# Patient Record
Sex: MALE | Race: WHITE | NOT HISPANIC OR LATINO | URBAN - METROPOLITAN AREA
[De-identification: names, ages, dates, MRNs, and addresses within clinical notes are randomized per-mention and may not be internally consistent; named-entity substitution may affect disease eponyms.]

---

## 2022-10-27 ENCOUNTER — INPATIENT (INPATIENT)
Facility: HOSPITAL | Age: 62
LOS: 5 days | Discharge: HOME | End: 2022-11-02
Attending: STUDENT IN AN ORGANIZED HEALTH CARE EDUCATION/TRAINING PROGRAM | Admitting: STUDENT IN AN ORGANIZED HEALTH CARE EDUCATION/TRAINING PROGRAM

## 2022-10-27 VITALS
DIASTOLIC BLOOD PRESSURE: 96 MMHG | TEMPERATURE: 98 F | HEART RATE: 113 BPM | RESPIRATION RATE: 18 BRPM | SYSTOLIC BLOOD PRESSURE: 138 MMHG | OXYGEN SATURATION: 96 %

## 2022-10-27 DIAGNOSIS — I21.4 NON-ST ELEVATION (NSTEMI) MYOCARDIAL INFARCTION: ICD-10-CM

## 2022-10-27 LAB
ALBUMIN SERPL ELPH-MCNC: 4.4 G/DL — SIGNIFICANT CHANGE UP (ref 3.3–5)
ALP SERPL-CCNC: 55 U/L — SIGNIFICANT CHANGE UP (ref 40–120)
ALT FLD-CCNC: 53 U/L — HIGH (ref 4–41)
ANION GAP SERPL CALC-SCNC: 12 MMOL/L — SIGNIFICANT CHANGE UP (ref 7–14)
ANION GAP SERPL CALC-SCNC: 15 MMOL/L — HIGH (ref 7–14)
APTT BLD: 28.2 SEC — SIGNIFICANT CHANGE UP (ref 27–36.3)
AST SERPL-CCNC: 77 U/L — HIGH (ref 4–40)
BASOPHILS # BLD AUTO: 0.04 K/UL — SIGNIFICANT CHANGE UP (ref 0–0.2)
BASOPHILS NFR BLD AUTO: 0.5 % — SIGNIFICANT CHANGE UP (ref 0–2)
BILIRUB SERPL-MCNC: 1.7 MG/DL — HIGH (ref 0.2–1.2)
BUN SERPL-MCNC: 12 MG/DL — SIGNIFICANT CHANGE UP (ref 7–23)
BUN SERPL-MCNC: 12 MG/DL — SIGNIFICANT CHANGE UP (ref 7–23)
CALCIUM SERPL-MCNC: 9.7 MG/DL — SIGNIFICANT CHANGE UP (ref 8.4–10.5)
CALCIUM SERPL-MCNC: 9.8 MG/DL — SIGNIFICANT CHANGE UP (ref 8.4–10.5)
CHLORIDE SERPL-SCNC: 97 MMOL/L — LOW (ref 98–107)
CHLORIDE SERPL-SCNC: 98 MMOL/L — SIGNIFICANT CHANGE UP (ref 98–107)
CO2 SERPL-SCNC: 25 MMOL/L — SIGNIFICANT CHANGE UP (ref 22–31)
CO2 SERPL-SCNC: 31 MMOL/L — SIGNIFICANT CHANGE UP (ref 22–31)
CREAT SERPL-MCNC: 0.96 MG/DL — SIGNIFICANT CHANGE UP (ref 0.5–1.3)
CREAT SERPL-MCNC: 1.02 MG/DL — SIGNIFICANT CHANGE UP (ref 0.5–1.3)
EGFR: 83 ML/MIN/1.73M2 — SIGNIFICANT CHANGE UP
EGFR: 89 ML/MIN/1.73M2 — SIGNIFICANT CHANGE UP
EOSINOPHIL # BLD AUTO: 0.16 K/UL — SIGNIFICANT CHANGE UP (ref 0–0.5)
EOSINOPHIL NFR BLD AUTO: 1.8 % — SIGNIFICANT CHANGE UP (ref 0–6)
FLUAV AG NPH QL: SIGNIFICANT CHANGE UP
FLUBV AG NPH QL: SIGNIFICANT CHANGE UP
GLUCOSE SERPL-MCNC: 102 MG/DL — HIGH (ref 70–99)
GLUCOSE SERPL-MCNC: 94 MG/DL — SIGNIFICANT CHANGE UP (ref 70–99)
HCT VFR BLD CALC: 41.6 % — SIGNIFICANT CHANGE UP (ref 39–50)
HGB BLD-MCNC: 13.8 G/DL — SIGNIFICANT CHANGE UP (ref 13–17)
IANC: 6.73 K/UL — SIGNIFICANT CHANGE UP (ref 1.8–7.4)
IMM GRANULOCYTES NFR BLD AUTO: 0.7 % — SIGNIFICANT CHANGE UP (ref 0–0.9)
INR BLD: 1.35 RATIO — HIGH (ref 0.88–1.16)
LYMPHOCYTES # BLD AUTO: 1.03 K/UL — SIGNIFICANT CHANGE UP (ref 1–3.3)
LYMPHOCYTES # BLD AUTO: 11.7 % — LOW (ref 13–44)
MAGNESIUM SERPL-MCNC: 1.7 MG/DL — SIGNIFICANT CHANGE UP (ref 1.6–2.6)
MCHC RBC-ENTMCNC: 29.4 PG — SIGNIFICANT CHANGE UP (ref 27–34)
MCHC RBC-ENTMCNC: 33.2 GM/DL — SIGNIFICANT CHANGE UP (ref 32–36)
MCV RBC AUTO: 88.5 FL — SIGNIFICANT CHANGE UP (ref 80–100)
MONOCYTES # BLD AUTO: 0.8 K/UL — SIGNIFICANT CHANGE UP (ref 0–0.9)
MONOCYTES NFR BLD AUTO: 9.1 % — SIGNIFICANT CHANGE UP (ref 2–14)
NEUTROPHILS # BLD AUTO: 6.73 K/UL — SIGNIFICANT CHANGE UP (ref 1.8–7.4)
NEUTROPHILS NFR BLD AUTO: 76.2 % — SIGNIFICANT CHANGE UP (ref 43–77)
NRBC # BLD: 0 /100 WBCS — SIGNIFICANT CHANGE UP (ref 0–0)
NRBC # FLD: 0 K/UL — SIGNIFICANT CHANGE UP (ref 0–0)
NT-PROBNP SERPL-SCNC: 4021 PG/ML — HIGH
PLATELET # BLD AUTO: 283 K/UL — SIGNIFICANT CHANGE UP (ref 150–400)
POTASSIUM SERPL-MCNC: 3.2 MMOL/L — LOW (ref 3.5–5.3)
POTASSIUM SERPL-MCNC: SIGNIFICANT CHANGE UP MMOL/L (ref 3.5–5.3)
POTASSIUM SERPL-SCNC: 3.2 MMOL/L — LOW (ref 3.5–5.3)
POTASSIUM SERPL-SCNC: SIGNIFICANT CHANGE UP MMOL/L (ref 3.5–5.3)
PROT SERPL-MCNC: 7.8 G/DL — SIGNIFICANT CHANGE UP (ref 6–8.3)
PROTHROM AB SERPL-ACNC: 15.7 SEC — HIGH (ref 10.5–13.4)
RBC # BLD: 4.7 M/UL — SIGNIFICANT CHANGE UP (ref 4.2–5.8)
RBC # FLD: 13.7 % — SIGNIFICANT CHANGE UP (ref 10.3–14.5)
RSV RNA NPH QL NAA+NON-PROBE: SIGNIFICANT CHANGE UP
SARS-COV-2 RNA SPEC QL NAA+PROBE: SIGNIFICANT CHANGE UP
SODIUM SERPL-SCNC: 137 MMOL/L — SIGNIFICANT CHANGE UP (ref 135–145)
SODIUM SERPL-SCNC: 141 MMOL/L — SIGNIFICANT CHANGE UP (ref 135–145)
TROPONIN T, HIGH SENSITIVITY RESULT: 129 NG/L — CRITICAL HIGH
TROPONIN T, HIGH SENSITIVITY RESULT: 144 NG/L — CRITICAL HIGH
WBC # BLD: 8.82 K/UL — SIGNIFICANT CHANGE UP (ref 3.8–10.5)
WBC # FLD AUTO: 8.82 K/UL — SIGNIFICANT CHANGE UP (ref 3.8–10.5)

## 2022-10-27 PROCEDURE — 99285 EMERGENCY DEPT VISIT HI MDM: CPT

## 2022-10-27 PROCEDURE — 71045 X-RAY EXAM CHEST 1 VIEW: CPT | Mod: 26

## 2022-10-27 PROCEDURE — 93010 ELECTROCARDIOGRAM REPORT: CPT

## 2022-10-27 RX ORDER — HEPARIN SODIUM 5000 [USP'U]/ML
5000 INJECTION INTRAVENOUS; SUBCUTANEOUS ONCE
Refills: 0 | Status: COMPLETED | OUTPATIENT
Start: 2022-10-27 | End: 2022-10-27

## 2022-10-27 RX ORDER — HEPARIN SODIUM 5000 [USP'U]/ML
6000 INJECTION INTRAVENOUS; SUBCUTANEOUS EVERY 6 HOURS
Refills: 0 | Status: DISCONTINUED | OUTPATIENT
Start: 2022-10-27 | End: 2022-10-28

## 2022-10-27 RX ORDER — FUROSEMIDE 40 MG
40 TABLET ORAL ONCE
Refills: 0 | Status: COMPLETED | OUTPATIENT
Start: 2022-10-27 | End: 2022-10-27

## 2022-10-27 RX ORDER — HEPARIN SODIUM 5000 [USP'U]/ML
INJECTION INTRAVENOUS; SUBCUTANEOUS
Qty: 25000 | Refills: 0 | Status: DISCONTINUED | OUTPATIENT
Start: 2022-10-27 | End: 2022-10-28

## 2022-10-27 RX ADMIN — HEPARIN SODIUM 5000 UNIT(S): 5000 INJECTION INTRAVENOUS; SUBCUTANEOUS at 22:56

## 2022-10-27 RX ADMIN — HEPARIN SODIUM 1000 UNIT(S)/HR: 5000 INJECTION INTRAVENOUS; SUBCUTANEOUS at 22:57

## 2022-10-27 RX ADMIN — Medication 40 MILLIGRAM(S): at 21:53

## 2022-10-27 NOTE — ED PROVIDER NOTE - EKG ADDITIONAL INFORMATION FREE TEXT
Carlos: No hyper-acute T waves, no malignant dysrhythmia, no ischemic ST segment changes. MAYRA shepard (old).

## 2022-10-27 NOTE — ED ADULT NURSE NOTE - OBJECTIVE STATEMENT
Patient is a 63 yo male, unknown PMH, presenting from [facility] with NSTEMI. AAOx4, no signs of distress, reports he went to the hospital because he was experiencing shortness of breath with exertion, orthopnea and leg swelling. He reports this happened to him 1 year ago and then he had a syncopal episode, went to [facility] and was given cardiac medications but has not had any follow-up. Does not remember diagnoses or medications he takes. Lung sounds clear, b/l lower extremities with 2+ edema. Denies chest pain, dizziness, palpitations, fever, chills, cough, nausea, vomiting. Placed on cardiac monitor, afib in 120s, other VSS. Has PIV in RAC. Fall precautions maintained.

## 2022-10-27 NOTE — CONSULT NOTE ADULT - SUBJECTIVE AND OBJECTIVE BOX
HPI: 61yo M w/ nonobstructive CAD, HFrEF, (noncompliant with follow-up or meds), etoh abuse (last drink 2 months ago) presents to outside hospital ([facility]) with SOB and lower extremity edema x 5 days.  Found to have elevated troponins and transferred to [facility] for NSTEMI and ischemic eval.  Patient currently denies any symptoms states dyspnea on exertion and lower extremity swelling for 5 days.  Patient states had a cardiac angiogram one year ago at [facility] showing no blockages and an echo showing a weak heart.  Patient states he did not follow-up with any doctors after discharge due to being lazy. Denies any chest pain, cough, fevers, dizziness, syncope, abd pain, back pain, N/V/D, recent sick contact, recent travel.     	   No Known Allergies    Intolerances    MEDICATIONS:    PAST MEDICAL & SURGICAL HISTORY:    FAMILY HISTORY:    SUBSTANCE USE  Tobacco Usage:  denies  Alcohol Usage: denies  Recreational drugs: denies    REVIEW OF SYSTEMS:  CONSTITUTIONAL: No fevers, No chills, No fatigue, + weight gain  RESPIRATORY: + shortness of breath, No cough, No wheezing, No hemoptysis  CARDIOVASCULAR: No chest pain. No palpitations, No pleuritic pain  GASTROINTESTINAL: No abdominal pain, No nausea, No vomiting, No hematemesis, No diarrhea No constipation. No melena  GENITOURINARY: No dysuria, No frequency, No incontinence, No hematuria  NEUROLOGICAL: No dizziness, No lightheadedness, No syncope, No LOC, No headache, No numbness or weakness  EXTREMITIES: + lower extremity Edema, No joint pain, No joint swelling.  SKIN: No diaphoresis. No itching, No rashes, No pressure ulcers  All other review of systems is negative unless indicated above.    T(C): 36.9 (10-27-22 @ 19:16), Max: 36.9 (10-27-22 @ 19:16)  HR: 126 (10-27-22 @ 19:16) (113 - 126)  BP: 117/96 (10-27-22 @ 19:16) (117/96 - 138/96)  RR: 22 (10-27-22 @ 19:16) (18 - 22)  SpO2: 97% (10-27-22 @ 19:16) (96% - 97%)  Wt(kg): --  I&O's Summary      Physical Exam:  General: NAD  Cardiovascular: Normal S1 S2, No JVD, No murmurs, No edema  Respiratory: Lungs clear to auscultation	  Gastrointestinal:  Soft, Non-tender, + BS	  Skin: warm and dry, No rashes, No ecchymoses, No cyanosis	  Extremities:  No clubbing, cyanosis or edema  Vascular: Peripheral pulses palpable 2+ bilaterally    CBC Full  -  ( 27 Oct 2022 19:37 )  WBC Count : 8.82 K/uL  Hemoglobin : 13.8 g/dL  Hematocrit : 41.6 %  Platelet Count - Automated : 283 K/uL  Mean Cell Volume : 88.5 fL  Mean Cell Hemoglobin : 29.4 pg  Mean Cell Hemoglobin Concentration : 33.2 gm/dL  Auto Neutrophil # : 6.73 K/uL  Auto Lymphocyte # : 1.03 K/uL  Auto Monocyte # : 0.80 K/uL  Auto Eosinophil # : 0.16 K/uL  Auto Basophil # : 0.04 K/uL  Auto Neutrophil % : 76.2 %  Auto Lymphocyte % : 11.7 %  Auto Monocyte % : 9.1 %  Auto Eosinophil % : 1.8 %  Auto Basophil % : 0.5 %    10-27    137  |  97<L>  |  12  ----------------------------<  102<H>  TNP   |  25  |  0.96    Ca    9.8      27 Oct 2022 19:37  Mg     1.70     10-27    TPro  7.8  /  Alb  4.4  /  TBili  1.7<H>  /  DBili  x   /  AST  77<H>  /  ALT  53<H>  /  AlkPhos  55  10-27    proBNP: Serum Pro-Brain Natriuretic Peptide: 4021 pg/mL (10-27 @ 19:37)     HPI: 61yo M w/ nonobstructive CAD, HFrEF, (noncompliant with follow-up or meds), etoh abuse (last drink 2 months ago) presents to outside hospital ([facility]) with SOB and lower extremity edema x 5 days.  Found to have elevated troponins and transferred to [facility] for NSTEMI and ischemic eval.  Patient currently denies any symptoms states dyspnea on exertion and lower extremity swelling for 5 days.  Patient states had a cardiac angiogram one year ago at [facility] showing no blockages and an echo showing a weak heart.  Patient states he did not follow-up with any doctors after discharge due to being lazy. Denies any chest pain, cough, fevers, dizziness, syncope, abd pain, back pain, N/V/D, recent sick contact, recent travel.     	   No Known Allergies    MEDICATIONS: non compliant does not take any home meds    PAST MEDICAL & SURGICAL HISTORY: hernia repair    FAMILY HISTORY: denies heart disease    SUBSTANCE USE  Tobacco Usage:  denies  Alcohol Usage: denies  Recreational drugs: denies    REVIEW OF SYSTEMS:  CONSTITUTIONAL: No fevers, No chills, No fatigue, + weight gain  RESPIRATORY: + shortness of breath, No cough, No wheezing, No hemoptysis  CARDIOVASCULAR: No chest pain. No palpitations, No pleuritic pain  GASTROINTESTINAL: No abdominal pain, No nausea, No vomiting, No hematemesis, No diarrhea No constipation. No melena  GENITOURINARY: No dysuria, No frequency, No incontinence, No hematuria  NEUROLOGICAL: No dizziness, No lightheadedness, No syncope, No LOC, No headache, No numbness or weakness  EXTREMITIES: + lower extremity Edema, No joint pain, No joint swelling.  SKIN: No diaphoresis. No itching, No rashes, No pressure ulcers  All other review of systems is negative unless indicated above.    T(C): 36.9 (10-27-22 @ 19:16), Max: 36.9 (10-27-22 @ 19:16)  HR: 126 (10-27-22 @ 19:16) (113 - 126)  BP: 117/96 (10-27-22 @ 19:16) (117/96 - 138/96)  RR: 22 (10-27-22 @ 19:16) (18 - 22)  SpO2: 97% (10-27-22 @ 19:16) (96% - 97%)  Wt(kg): --  I&O's Summary      Physical Exam:  General: NAD  Cardiovascular: Normal S1 S2, No JVD, No murmurs, No edema  Respiratory: Lungs clear to auscultation	  Gastrointestinal:  Soft, Non-tender, + BS	  Skin: warm and dry, No rashes, No ecchymoses, No cyanosis	  Extremities:  No clubbing, cyanosis or edema  Vascular: Peripheral pulses palpable 2+ bilaterally    CBC Full  -  ( 27 Oct 2022 19:37 )  WBC Count : 8.82 K/uL  Hemoglobin : 13.8 g/dL  Hematocrit : 41.6 %  Platelet Count - Automated : 283 K/uL  Mean Cell Volume : 88.5 fL  Mean Cell Hemoglobin : 29.4 pg  Mean Cell Hemoglobin Concentration : 33.2 gm/dL  Auto Neutrophil # : 6.73 K/uL  Auto Lymphocyte # : 1.03 K/uL  Auto Monocyte # : 0.80 K/uL  Auto Eosinophil # : 0.16 K/uL  Auto Basophil # : 0.04 K/uL  Auto Neutrophil % : 76.2 %  Auto Lymphocyte % : 11.7 %  Auto Monocyte % : 9.1 %  Auto Eosinophil % : 1.8 %  Auto Basophil % : 0.5 %    10-27    137  |  97<L>  |  12  ----------------------------<  102<H>  TNP   |  25  |  0.96    Ca    9.8      27 Oct 2022 19:37  Mg     1.70     10-27    TPro  7.8  /  Alb  4.4  /  TBili  1.7<H>  /  DBili  x   /  AST  77<H>  /  ALT  53<H>  /  AlkPhos  55  10-27    proBNP: Serum Pro-Brain Natriuretic Peptide: 4021 pg/mL (10-27 @ 19:37)     HPI: 63yo M w/ nonobstructive CAD, HFrEF, (noncompliant with follow-up or meds), etoh abuse (last drink 2 months ago) presents to outside hospital ([facility]) with SOB and lower extremity edema x 5 days.  Found to have elevated troponins and transferred to [facility] for NSTEMI and ischemic eval.  Patient currently denies any symptoms states dyspnea on exertion and lower extremity swelling for 5 days.  Patient states had a cardiac angiogram one year ago at [facility] showing no blockages and an echo showing a weak heart.  Patient states he did not follow-up with any doctors after discharge due to being lazy. Denies any chest pain, cough, fevers, dizziness, syncope, abd pain, back pain, N/V/D, recent sick contact, recent travel.     	   No Known Allergies    MEDICATIONS: non compliant does not take any home meds    PAST MEDICAL & SURGICAL HISTORY: hernia repair    FAMILY HISTORY: denies heart disease    SUBSTANCE USE  Tobacco Usage:  denies  Alcohol Usage: denies  Recreational drugs: denies    REVIEW OF SYSTEMS:  CONSTITUTIONAL: No fevers, No chills, No fatigue, + weight gain  RESPIRATORY: + shortness of breath, No cough, No wheezing, No hemoptysis  CARDIOVASCULAR: No chest pain. No palpitations, No pleuritic pain  GASTROINTESTINAL: No abdominal pain, No nausea, No vomiting, No hematemesis, No diarrhea No constipation. No melena  GENITOURINARY: No dysuria, No frequency, No incontinence, No hematuria  NEUROLOGICAL: No dizziness, No lightheadedness, No syncope, No LOC, No headache, No numbness or weakness  EXTREMITIES: + lower extremity Edema, No joint pain, No joint swelling.  SKIN: No diaphoresis. No itching, No rashes, No pressure ulcers  All other review of systems is negative unless indicated above.    T(C): 36.9 (10-27-22 @ 19:16), Max: 36.9 (10-27-22 @ 19:16)  HR: 126 (10-27-22 @ 19:16) (113 - 126)  BP: 117/96 (10-27-22 @ 19:16) (117/96 - 138/96)  RR: 22 (10-27-22 @ 19:16) (18 - 22)  SpO2: 97% (10-27-22 @ 19:16) (96% - 97%)    Physical Exam:  General: NAD  Cardiovascular: Normal S1 S2, + JVD, No murmurs, + edema  Respiratory: Lungs diminished to auscultation	  Gastrointestinal:  Soft, Non-tender, + BS	  Skin: warm and dry, No rashes, No ecchymoses, No cyanosis	  Extremities:  No clubbing, cyanosis or edema  Vascular: Peripheral pulses palpable 2+ bilaterally    CBC Full  -  ( 27 Oct 2022 19:37 )  WBC Count : 8.82 K/uL  Hemoglobin : 13.8 g/dL  Hematocrit : 41.6 %  Platelet Count - Automated : 283 K/uL  Mean Cell Volume : 88.5 fL  Mean Cell Hemoglobin : 29.4 pg  Mean Cell Hemoglobin Concentration : 33.2 gm/dL  Auto Neutrophil # : 6.73 K/uL  Auto Lymphocyte # : 1.03 K/uL  Auto Monocyte # : 0.80 K/uL  Auto Eosinophil # : 0.16 K/uL  Auto Basophil # : 0.04 K/uL  Auto Neutrophil % : 76.2 %  Auto Lymphocyte % : 11.7 %  Auto Monocyte % : 9.1 %  Auto Eosinophil % : 1.8 %  Auto Basophil % : 0.5 %    10-27    137  |  97<L>  |  12  ----------------------------<  102<H>  TNP   |  25  |  0.96    Ca    9.8      27 Oct 2022 19:37  Mg     1.70     10-27    TPro  7.8  /  Alb  4.4  /  TBili  1.7<H>  /  DBili  x   /  AST  77<H>  /  ALT  53<H>  /  AlkPhos  55  10-27    proBNP: Serum Pro-Brain Natriuretic Peptide: 4021 pg/mL (10-27 @ 19:37)

## 2022-10-27 NOTE — ED ADULT NURSE NOTE - ED STAT RN HANDOFF DETAILS 2
Patient being transported to telemetry as per, MD Selam Thomas patient can go off monitor with heparin drip at 1900. Patient being transported via stretcher stable in no acute distress safety maintained.

## 2022-10-27 NOTE — ED PROVIDER NOTE - NS ED ROS FT
CONSTITUTIONAL - No fever, No diaphoresis, No weight change  SKIN - No rash  HEMATOLOGIC - No abnormal bleeding or bruising  EYES - No eye pain, No blurred vision  ENT - No change in hearing, No sore throat, No neck pain, No rhinorrhea, No ear pain  RESPIRATORY +shortness of breath, No cough  CARDIAC -No chest pain, No palpitations  GI - No abdominal pain, No nausea, No vomiting, No diarrhea, No constipation  - No dysuria, no frequency, no hematuria.   MUSCULOSKELETAL - No joint pain, No swelling, No back pain  NEUROLOGIC - No numbness, No focal weakness, No headache, No dizziness

## 2022-10-27 NOTE — ED PROVIDER NOTE - CARE PLAN
1 Principal Discharge DX:	Chest pain   Principal Discharge DX:	Non-ST elevation MI (NSTEMI)  Secondary Diagnosis:	Chest pain

## 2022-10-27 NOTE — ED PROVIDER NOTE - CLINICAL SUMMARY MEDICAL DECISION MAKING FREE TEXT BOX
Attempted to call report. Carlos: Transferred from [facility] for NSTEMI. Got aspirin and Brilinta. got Lasix. Accepted  by cardiologist Dr. Tj Gillespie. Admit to telemetry.

## 2022-10-27 NOTE — ED ADULT NURSE NOTE - CHIEF COMPLAINT QUOTE
Pt presents to ED via EMS as transfer from [facility] for NSTEMI. Pt went to hospital with c/o shortness of breath and was found to have elevated troponin. Pt denies chest pain. Pt was given 325mg ASA, 180 Brilinta and a duoneb. PT arrives on 2lpm NC. Pt denies chest pain or difficulty breathing at this time. Pt was given decon shower for bed bugs at [facility].

## 2022-10-27 NOTE — ED PROVIDER NOTE - ATTENDING CONTRIBUTION TO CARE
I performed a face-to-face evaluation of the patient and performed a history and physical examination. I agree with the history and physical examination. If this was a PA visit, I personally saw the patient with the PA and performed a substantive portion of the visit including all aspects of the medical decision making.    Carlos: Transferred from [facility] for NSTEMI. Got aspirin and Brilinta. got Lasix. Accepted  by cardiologist Dr. Tj Gillespie. Admit to telemetry.

## 2022-10-27 NOTE — CONSULT NOTE ADULT - ASSESSMENT
63yo M w/ nonobstructive CAD, HFrEF, (noncompliant with follow-up or meds), etoh abuse (last drink 2 months ago) presents to outside hospital ([facility]) with SOB and lower extremity edema x 5 days.  Found to have elevated troponins and transferred to [facility] for NSTEMI and ischemic eval.    # ADHF  # NSTEMI  # A-Fib  Patient with elevated CE, dyspnea and lower extremity edema. Chest pain free at present. EKG w/ Afib @ 104bpm  Admit to telemetry  S/p ASA and Brilinta load at [facility] today prior to transfer can start heparin gtt for ACS and Afib  Diuresis: 40mg iv lasix BID with strict I/O's and daily weights, check pBNP today and again in 48hrs  Rate control: Will monitor off AVN blockers for now HR may improve with diuresis, Goal HR<110. If persistent Afib RVR can start metoprolol 12.5mg BID and titrate as tolerated  Serial EKG PRN chest pain to assess for ST changes  Continuous cardiac monitoring to monitor for arrhythmias  CBC, CMP, coags, HbA1C, TSH, lipids for comorbidities, Trend cardiac enzymes  Aspirin 81 PO daily, Brilinta 90 mg PO BID, Lipitor 80 mg PO daily  NPO MN for cardiac cath in am. If persistent chest pain with EKG changes, will plan for emergent cath   Hold ACE for now in anticipation of possible cath.   Low fat DASH diet  ECHO: TTE in am    Thank you, if any questions or clinical situation changes please call [employer] #[phone].  Attending Attestation to follow   61yo M w/ nonobstructive CAD, HFrEF, (noncompliant with follow-up or meds), etoh abuse (last drink 2 months ago) presents to outside hospital ([facility]) with SOB and lower extremity edema x 5 days.  Found to have elevated troponins and transferred to [facility] for NSTEMI and ischemic eval.    # ADHF  # NSTEMI  # A-Fib  Patient with elevated CE, dyspnea and lower extremity edema. Chest pain free at present. EKG w/ Afib @ 104bpm, elevated cardiac enzymes may be in setting of ADHF and vs ACS  Admit to telemetry  S/p ASA and Brilinta load at [facility] today prior to transfer can start heparin gtt for ACS and Afib  Diuresis: 40mg iv lasix BID with strict I/O's and daily weights, check pBNP today and again in 48hrs  Rate control: Will monitor off AVN blockers for now HR may improve with diuresis, Goal HR<110. If persistent Afib RVR can start metoprolol 12.5mg BID and titrate as tolerated  Serial EKG PRN chest pain to assess for ST changes  Continuous cardiac monitoring to monitor for arrhythmias  CBC, CMP, coags, HbA1C, TSH, lipids for comorbidities, Trend cardiac enzymes  Aspirin 81 PO daily, Brilinta 90 mg PO BID, Lipitor 80 mg PO daily  NPO MN for cardiac cath in am. If persistent chest pain with EKG changes, will plan for emergent cath   Hold ACE for now in anticipation of possible cath.   Low fat DASH diet  ECHO: TTE in am    Thank you, if any questions or clinical situation changes please call [employer] #[phone].  Attending Attestation to follow

## 2022-10-27 NOTE — ED PROVIDER NOTE - PHYSICAL EXAMINATION
CONSTITUTIONAL: Well-developed; well-nourished; in no acute distress.   SKIN: warm, dry  HEAD: Normocephalic; atraumatic.  EYES: no conjunctival injection. PERRL.   ENT: No nasal discharge; airway clear.  NECK: Supple; non tender.  CARD: S1, S2 normal; no murmurs, gallops, or rubs. Regular rate and rhythm.   RESP: No wheezes, rales or rhonchi. Good air movement bilaterally.   ABD: soft ntnd, no guarding, no distention, no rigidity.   EXT: +2 distal edema   NEURO: Alert, oriented, grossly unremarkable  PSYCH: Cooperative, appropriate.

## 2022-10-27 NOTE — ED ADULT TRIAGE NOTE - CHIEF COMPLAINT QUOTE
Pt presents to ED via EMS as transfer from [facility] for NSTEMI. Pt went to hospital with c/o shortness of breath and was found to have elevated troponin. Pt denies chest pain. Pt was given 325mg ASA, 180 Brilinta and a duoneb. PT arrives on 2lpm NC. Pt denies chest pain or difficulty breathing at this time. Pt was given decon shower for bed bugs at [facility].

## 2022-10-27 NOTE — ED PROVIDER NOTE - OBJECTIVE STATEMENT
Carlos: Transferred from [facility] for NSTEMI (CP, SOB). Got aspirin and Brilinta. Got Lasix. Accepted  by cardiologist Dr. Gillespie. Admit to telemetry. Carlos: Transferred from [facility] for NSTEMI (CP, SOB). Got aspirin and Brilinta. Got Lasix. Accepted  by cardiologist Dr. Gillespie. Admit to telemetry.    [place] DO PGY-3:  63 y/o M w/ pmhx of CAD, CHF presents for NSTEMI transfer from [facility]. Pt denies taking any daily meds. Former smoker. Sx of sob / leg swelling for 5 days. already received ASA and brilinta loading prior to transfer

## 2022-10-28 ENCOUNTER — TRANSCRIPTION ENCOUNTER (OUTPATIENT)
Age: 62
End: 2022-10-28

## 2022-10-28 DIAGNOSIS — Z29.9 ENCOUNTER FOR PROPHYLACTIC MEASURES, UNSPECIFIED: ICD-10-CM

## 2022-10-28 DIAGNOSIS — I48.91 UNSPECIFIED ATRIAL FIBRILLATION: ICD-10-CM

## 2022-10-28 DIAGNOSIS — F10.10 ALCOHOL ABUSE, UNCOMPLICATED: ICD-10-CM

## 2022-10-28 DIAGNOSIS — I21.4 NON-ST ELEVATION (NSTEMI) MYOCARDIAL INFARCTION: ICD-10-CM

## 2022-10-28 DIAGNOSIS — I50.9 HEART FAILURE, UNSPECIFIED: ICD-10-CM

## 2022-10-28 LAB
ALBUMIN SERPL ELPH-MCNC: 4.1 G/DL — SIGNIFICANT CHANGE UP (ref 3.3–5)
ALP SERPL-CCNC: 62 U/L — SIGNIFICANT CHANGE UP (ref 40–120)
ALT FLD-CCNC: 48 U/L — HIGH (ref 4–41)
ANION GAP SERPL CALC-SCNC: 15 MMOL/L — HIGH (ref 7–14)
APTT BLD: 30.1 SEC — SIGNIFICANT CHANGE UP (ref 27–36.3)
APTT BLD: 38.7 SEC — HIGH (ref 27–36.3)
APTT BLD: 50 SEC — HIGH (ref 27–36.3)
AST SERPL-CCNC: 44 U/L — HIGH (ref 4–40)
BILIRUB DIRECT SERPL-MCNC: 0.4 MG/DL — HIGH (ref 0–0.3)
BILIRUB INDIRECT FLD-MCNC: 1.4 MG/DL — HIGH (ref 0–1)
BILIRUB SERPL-MCNC: 1.8 MG/DL — HIGH (ref 0.2–1.2)
BLD GP AB SCN SERPL QL: NEGATIVE — SIGNIFICANT CHANGE UP
BUN SERPL-MCNC: 12 MG/DL — SIGNIFICANT CHANGE UP (ref 7–23)
CALCIUM SERPL-MCNC: 9.2 MG/DL — SIGNIFICANT CHANGE UP (ref 8.4–10.5)
CHLORIDE SERPL-SCNC: 98 MMOL/L — SIGNIFICANT CHANGE UP (ref 98–107)
CK MB BLD-MCNC: 2.2 % — SIGNIFICANT CHANGE UP (ref 0–2.5)
CK MB CFR SERPL CALC: 12.8 NG/ML — HIGH
CK SERPL-CCNC: 584 U/L — HIGH (ref 30–200)
CO2 SERPL-SCNC: 26 MMOL/L — SIGNIFICANT CHANGE UP (ref 22–31)
CREAT SERPL-MCNC: 1.03 MG/DL — SIGNIFICANT CHANGE UP (ref 0.5–1.3)
EGFR: 82 ML/MIN/1.73M2 — SIGNIFICANT CHANGE UP
GLUCOSE SERPL-MCNC: 103 MG/DL — HIGH (ref 70–99)
HCT VFR BLD CALC: 39.7 % — SIGNIFICANT CHANGE UP (ref 39–50)
HGB BLD-MCNC: 13.2 G/DL — SIGNIFICANT CHANGE UP (ref 13–17)
MAGNESIUM SERPL-MCNC: 1.9 MG/DL — SIGNIFICANT CHANGE UP (ref 1.6–2.6)
MCHC RBC-ENTMCNC: 29.4 PG — SIGNIFICANT CHANGE UP (ref 27–34)
MCHC RBC-ENTMCNC: 33.2 GM/DL — SIGNIFICANT CHANGE UP (ref 32–36)
MCV RBC AUTO: 88.4 FL — SIGNIFICANT CHANGE UP (ref 80–100)
NRBC # BLD: 0 /100 WBCS — SIGNIFICANT CHANGE UP (ref 0–0)
NRBC # FLD: 0 K/UL — SIGNIFICANT CHANGE UP (ref 0–0)
PHOSPHATE SERPL-MCNC: 3.7 MG/DL — SIGNIFICANT CHANGE UP (ref 2.5–4.5)
PLATELET # BLD AUTO: 243 K/UL — SIGNIFICANT CHANGE UP (ref 150–400)
POTASSIUM SERPL-MCNC: 3.5 MMOL/L — SIGNIFICANT CHANGE UP (ref 3.5–5.3)
POTASSIUM SERPL-SCNC: 3.5 MMOL/L — SIGNIFICANT CHANGE UP (ref 3.5–5.3)
PROT SERPL-MCNC: 6.7 G/DL — SIGNIFICANT CHANGE UP (ref 6–8.3)
RBC # BLD: 4.49 M/UL — SIGNIFICANT CHANGE UP (ref 4.2–5.8)
RBC # FLD: 14 % — SIGNIFICANT CHANGE UP (ref 10.3–14.5)
RH IG SCN BLD-IMP: POSITIVE — SIGNIFICANT CHANGE UP
SODIUM SERPL-SCNC: 139 MMOL/L — SIGNIFICANT CHANGE UP (ref 135–145)
TROPONIN T, HIGH SENSITIVITY RESULT: 132 NG/L — CRITICAL HIGH
TSH SERPL-MCNC: 2.9 UIU/ML — SIGNIFICANT CHANGE UP (ref 0.27–4.2)
WBC # BLD: 6.73 K/UL — SIGNIFICANT CHANGE UP (ref 3.8–10.5)
WBC # FLD AUTO: 6.73 K/UL — SIGNIFICANT CHANGE UP (ref 3.8–10.5)

## 2022-10-28 PROCEDURE — 99285 EMERGENCY DEPT VISIT HI MDM: CPT

## 2022-10-28 PROCEDURE — 12345: CPT | Mod: NC

## 2022-10-28 PROCEDURE — 76705 ECHO EXAM OF ABDOMEN: CPT | Mod: 26

## 2022-10-28 PROCEDURE — 93306 TTE W/DOPPLER COMPLETE: CPT | Mod: 26

## 2022-10-28 PROCEDURE — 99223 1ST HOSP IP/OBS HIGH 75: CPT

## 2022-10-28 RX ORDER — HEPARIN SODIUM 5000 [USP'U]/ML
4000 INJECTION INTRAVENOUS; SUBCUTANEOUS EVERY 6 HOURS
Refills: 0 | Status: DISCONTINUED | OUTPATIENT
Start: 2022-10-28 | End: 2022-11-01

## 2022-10-28 RX ORDER — APIXABAN 2.5 MG/1
1 TABLET, FILM COATED ORAL
Qty: 60 | Refills: 0
Start: 2022-10-28 | End: 2022-11-26

## 2022-10-28 RX ORDER — MAGNESIUM SULFATE 500 MG/ML
1 VIAL (ML) INJECTION ONCE
Refills: 0 | Status: COMPLETED | OUTPATIENT
Start: 2022-10-28 | End: 2022-10-28

## 2022-10-28 RX ORDER — ATORVASTATIN CALCIUM 80 MG/1
80 TABLET, FILM COATED ORAL AT BEDTIME
Refills: 0 | Status: DISCONTINUED | OUTPATIENT
Start: 2022-10-28 | End: 2022-11-02

## 2022-10-28 RX ORDER — ACETAMINOPHEN 500 MG
650 TABLET ORAL EVERY 6 HOURS
Refills: 0 | Status: DISCONTINUED | OUTPATIENT
Start: 2022-10-28 | End: 2022-11-02

## 2022-10-28 RX ORDER — HEPARIN SODIUM 5000 [USP'U]/ML
8000 INJECTION INTRAVENOUS; SUBCUTANEOUS EVERY 6 HOURS
Refills: 0 | Status: DISCONTINUED | OUTPATIENT
Start: 2022-10-28 | End: 2022-11-01

## 2022-10-28 RX ORDER — ASPIRIN/CALCIUM CARB/MAGNESIUM 324 MG
81 TABLET ORAL DAILY
Refills: 0 | Status: DISCONTINUED | OUTPATIENT
Start: 2022-10-28 | End: 2022-11-02

## 2022-10-28 RX ORDER — HEPARIN SODIUM 5000 [USP'U]/ML
1900 INJECTION INTRAVENOUS; SUBCUTANEOUS
Qty: 25000 | Refills: 0 | Status: DISCONTINUED | OUTPATIENT
Start: 2022-10-28 | End: 2022-11-01

## 2022-10-28 RX ORDER — TICAGRELOR 90 MG/1
90 TABLET ORAL EVERY 12 HOURS
Refills: 0 | Status: DISCONTINUED | OUTPATIENT
Start: 2022-10-28 | End: 2022-11-01

## 2022-10-28 RX ORDER — INFLUENZA VIRUS VACCINE 15; 15; 15; 15 UG/.5ML; UG/.5ML; UG/.5ML; UG/.5ML
0.5 SUSPENSION INTRAMUSCULAR ONCE
Refills: 0 | Status: DISCONTINUED | OUTPATIENT
Start: 2022-10-28 | End: 2022-11-02

## 2022-10-28 RX ORDER — POTASSIUM CHLORIDE 20 MEQ
40 PACKET (EA) ORAL EVERY 4 HOURS
Refills: 0 | Status: COMPLETED | OUTPATIENT
Start: 2022-10-28 | End: 2022-10-28

## 2022-10-28 RX ORDER — FUROSEMIDE 40 MG
40 TABLET ORAL EVERY 12 HOURS
Refills: 0 | Status: DISCONTINUED | OUTPATIENT
Start: 2022-10-28 | End: 2022-11-01

## 2022-10-28 RX ORDER — POTASSIUM CHLORIDE 20 MEQ
10 PACKET (EA) ORAL
Refills: 0 | Status: COMPLETED | OUTPATIENT
Start: 2022-10-28 | End: 2022-10-28

## 2022-10-28 RX ORDER — METOPROLOL TARTRATE 50 MG
12.5 TABLET ORAL
Refills: 0 | Status: COMPLETED | OUTPATIENT
Start: 2022-10-28 | End: 2022-10-29

## 2022-10-28 RX ORDER — LANOLIN ALCOHOL/MO/W.PET/CERES
3 CREAM (GRAM) TOPICAL AT BEDTIME
Refills: 0 | Status: DISCONTINUED | OUTPATIENT
Start: 2022-10-28 | End: 2022-11-02

## 2022-10-28 RX ADMIN — Medication 100 MILLIEQUIVALENT(S): at 06:39

## 2022-10-28 RX ADMIN — Medication 100 MILLIEQUIVALENT(S): at 03:59

## 2022-10-28 RX ADMIN — Medication 40 MILLIEQUIVALENT(S): at 05:46

## 2022-10-28 RX ADMIN — ATORVASTATIN CALCIUM 80 MILLIGRAM(S): 80 TABLET, FILM COATED ORAL at 22:36

## 2022-10-28 RX ADMIN — Medication 100 GRAM(S): at 11:44

## 2022-10-28 RX ADMIN — HEPARIN SODIUM 6000 UNIT(S): 5000 INJECTION INTRAVENOUS; SUBCUTANEOUS at 05:49

## 2022-10-28 RX ADMIN — TICAGRELOR 90 MILLIGRAM(S): 90 TABLET ORAL at 05:46

## 2022-10-28 RX ADMIN — TICAGRELOR 90 MILLIGRAM(S): 90 TABLET ORAL at 17:51

## 2022-10-28 RX ADMIN — Medication 12.5 MILLIGRAM(S): at 17:52

## 2022-10-28 RX ADMIN — Medication 100 MILLIEQUIVALENT(S): at 02:44

## 2022-10-28 RX ADMIN — Medication 40 MILLIGRAM(S): at 10:02

## 2022-10-28 RX ADMIN — HEPARIN SODIUM 1900 UNIT(S)/HR: 5000 INJECTION INTRAVENOUS; SUBCUTANEOUS at 20:29

## 2022-10-28 RX ADMIN — HEPARIN SODIUM 1900 UNIT(S)/HR: 5000 INJECTION INTRAVENOUS; SUBCUTANEOUS at 18:34

## 2022-10-28 RX ADMIN — HEPARIN SODIUM 1500 UNIT(S)/HR: 5000 INJECTION INTRAVENOUS; SUBCUTANEOUS at 11:56

## 2022-10-28 RX ADMIN — Medication 40 MILLIEQUIVALENT(S): at 02:47

## 2022-10-28 RX ADMIN — Medication 40 MILLIGRAM(S): at 22:36

## 2022-10-28 RX ADMIN — Medication 81 MILLIGRAM(S): at 11:46

## 2022-10-28 RX ADMIN — Medication 100 GRAM(S): at 02:44

## 2022-10-28 RX ADMIN — HEPARIN SODIUM 1300 UNIT(S)/HR: 5000 INJECTION INTRAVENOUS; SUBCUTANEOUS at 05:47

## 2022-10-28 RX ADMIN — Medication 12.5 MILLIGRAM(S): at 06:02

## 2022-10-28 RX ADMIN — HEPARIN SODIUM 8000 UNIT(S): 5000 INJECTION INTRAVENOUS; SUBCUTANEOUS at 18:36

## 2022-10-28 NOTE — PATIENT PROFILE ADULT - ARRIVAL FROM
I discussed this case with the resident. I agree with the Resident's plan.     Hospitals/Psychiatric Facilities

## 2022-10-28 NOTE — H&P ADULT - HISTORY OF PRESENT ILLNESS
62M with PMHx nonobstructive CAD, NICM (EF <30% 9/2021), ETOH abuse, Afib presenting with SOB and LE edema x5 days. Pt notes hx of CHF since last year but has been noncompliant with meds. Recalls being prescribed 7 medications but eventually running out of then. He believes he also has hx of Afib but is not sure. Pt has hx CHF with EF <30% and cath 9/30/21 with 20% ramus lesion and 30% prox RCA lesion. Over the last 5 days he noticed progressively worsening LE edema. He also has decreased ET. He gets SOB even bending down to  his cat food. He also endorses orthopnea as well worse from baseline. He denies fevers, chills, cough, CP, abdominal pain, vomiting, diarrhea. He had a recent viral URI with rhinorrhea and cough 2 weeks ago but these symptoms have subsided. Pt also has life stressor of wife passing away earlier this year. Pt does not take any medications and is not adherent to any specific diet. Pt's last drink was 6 weeks ago. Used to drink 4-6 beers per day.    Pt initially presented to [facility] and was given ASA, brillinta loads and lasix for suspected CHF exacerbation and NSTEMI. Also found to have afib (new onset?) and elevated troponin. He was transferred to [facility] for management of NSTEMI and for possible eventual cath

## 2022-10-28 NOTE — H&P ADULT - NSHPPHYSICALEXAM_GEN_ALL_CORE
PHYSICAL EXAM:  Vital Signs Last 24 Hrs  T(C): 36.8 (10-28-22 @ 00:13)  T(F): 98.3 (10-28-22 @ 00:13), Max: 98.5 (10-27-22 @ 19:16)  HR: 73 (10-28-22 @ 00:13) (73 - 126)  BP: 115/86 (10-28-22 @ 00:13)  BP(mean): --  RR: 19 (10-28-22 @ 00:13) (16 - 22)  SpO2: 100% (10-28-22 @ 00:13) (96% - 100%)  Wt(kg): --    Constitutional: NAD, awake and alert, well developed  EYES: EOMI, conjunctiva clear  ENT:  Normal Hearing, no tonsillar exudates   Neck: Soft and supple , no thyromegaly   Respiratory: mild crackles at bases, No wheezing, rales or rhonchi, no tachypnea, no accessory muscle use  Cardiovascular: S1 and S2, irregular rhythm and tachycardic, no Murmurs, gallops or rubs, no JVD, 2+ pitting edema bilaterally  Gastrointestinal: Bowel Sounds present, soft, nontender, nondistended, no guarding, no rebound  Extremities: No cyanosis or clubbing; warm to touch  Vascular: 2+ peripheral pulses lower ex  Neurological: No focal deficits, CN II-XII intact bilaterally, sensation to light touch intact in all extremities.   Musculoskeletal: 5/5 strength b/l upper and lower extremities; no joint swelling.  Skin: No rashes, no ulcerations

## 2022-10-28 NOTE — PROGRESS NOTE ADULT - PROBLEM SELECTOR PLAN 1
Hypervolemic with LE edema, mild crackles, elevated probnp, +SOB, dec ET, orthopnea c/w acute CHF exacerbation  Has hx of HFrEF <30% EF 9/2021. Pt is noncompliant with meds for the last year. Now with afib with RVR likely triggering CHF exacerbation  -cards eval appreciated  -lasix 40 IV BID  -strict I's and O's, daily weights, keep K>4, Mg>2 (repletion ordered, f/u repeat)  -echo ordered  -hold off ACE-I for now pending cath  -hold off metoprolol for now unless persistent HR >110s  -monitor on tele on admission,  LE edema, mild crackles, elevated BNP, +SOB, orthopnea   hx of HFrEF <30% EF 9/2021. Pt is noncompliant with meds for the last year.   Now with afib with RVR likely causing CHF exacerbation  TTE today showed EF 10% Severely dilated left atrium.  Moderate left ventricular enlargement.  Severe global left ventricular systolic dysfunction. Right ventricular enlargement with normal right  ventricular systolic function.    Continue IV Lasix 40mg BID   will keep NPO after midnight in anticipation of cath in am. continue hep gtt for now   Strict I/O, daily weight. Monitor UOP  Keep K>4 and Mg>2  hold off ACE-I for now pending cath  continue tele monitoring   Cards following

## 2022-10-28 NOTE — PROGRESS NOTE ADULT - PROBLEM SELECTOR PLAN 4
Last drink 6 weeks ago. No withdrawal sx at this time Last drink 6 weeks ago. No withdrawal sx at this time    Monitor Symptoms

## 2022-10-28 NOTE — H&P ADULT - ASSESSMENT
62M with PMHx nonobstructive CAD, NICM (EF <30% 9/2021), ETOH abuse, Afib presenting with SOB and LE edema x5 days. Admitted for acute CHF exacerbation and likely type 2 demand ischemia/NSTEMI

## 2022-10-28 NOTE — DISCHARGE NOTE PROVIDER - HOSPITAL COURSE
62M with PMHx nonobstructive CAD, NICM (EF <30% 9/2021), ETOH abuse, Afib presenting with SOB and LE edema x5 days. Admitted for acute CHF exacerbation and likely type 2 demand ischemia/NSTEMI. Was transffered from [facility] for poaaoble cath. Seen by cards, no plans for cath and non ischemic pattern. Plan for GDMT with outpatient followup       Problem/Plan - 1:  Acute exacerbation of CHF (congestive heart failure).    -on presentation with LE edema, mild crackles, elevated BNP, +SOB, orthopnea, now improved   - hx of HFrEF <30% EF 9/2021. Pt is noncompliant with meds for the last year, patient states due to lazziness   - Afib with RVR likely causing CHF exacerbation, TTE with EF 10% with severely dialted LA, moderate LV enlargement.   - CHF likely tachycardia induced vs ETOH induced CM given dilated LA, but will need ischemic evaluation; however patient unable to lay flat at this time, per cards no plans for cath ath this time, had LHC last year  - will plan to maximize with GDMT (losartan. metoprolol)   - on IV Lasix 40mg BID, plan to transition to PO tomorrow AM   - no plan for LHC or other intervention, will d/c hep gtt and restart on eliquis   - outpatient followup for ICD once on GDMT for 3 months   - Strict I/O, NEEDS daily weight. Monitor UOP.    Problem/Plan - 2:   NSTEMI (non-ST elevation myocardial infarction).   - Troponin 129--144 in setting of CHF exacerbation and rapid Afib.   - per cards Last LHC at [facility] 9/2021 with 20% ramus lesion and 30% prox RCA lesion  - s/p brillinta and aspirin load at [facility], trop appears non ischemic per cards, will d/c brillinta   - Continue tele monitoring  - Continue aspirin statin, Metoprolol   - transition from heparin gtt to PO eliquis.    Problem/Plan - 3:   Atrial fibrillation with rapid ventricular response.   - switched to toprol 50mg daily   - transition to Eliquis, d/c heparin gtt as no plan for LHC or other procedure in patient   - HR better controlled on current regimen 80s-100s.    Problem/Plan - 4:  ETOH abuse.   - Last drink 6 weeks ago. No withdrawal sx at this time; no tremors noted  - Monitor Symptoms.    Problem/Plan - 5:   Need for prophylactic measure.   DVT ppx: transition to eliquis   Dispo: no plans for cath during this inpatient, patient cannot lay flat at this time, will maximize GDMT.    On 11/2/2022 this case was reviewed with Dr. Parker, the patient is medically stable and optimized for discharge. All medications were reviewed and prescriptions were sent to mutually agreed upon pharmacy.

## 2022-10-28 NOTE — H&P ADULT - PROBLEM SELECTOR PLAN 4
Last drink 6 weeks ago. No withdrawal sx at this time How Severe Is Your Cyst?: moderate Is This A New Presentation, Or A Follow-Up?: Cyst

## 2022-10-28 NOTE — H&P ADULT - NSHPLABSRESULTS_GEN_ALL_CORE
I have personally reviewed this patient's labs below:                        13.8   8.82  )-----------( 283      ( 27 Oct 2022 19:37 )             41.6     10-27-22 @ 21:40    141  |  98  |  12             --------------------------< 94     3.2<L>  |  31  | 1.02    eGFR AA: --  eGFR N-AA: --    Calcium: 9.7  Phosphorus: --  Magnesium: --    AST: --    ALT: --  AlkPhos: --  Protein: --  Albumin: --  TBili: --  D-Bili: --  10-27-22 @ 19:37    137  |  97<L>  |  12             --------------------------< 102<H>     TNP  |  25  | 0.96    eGFR AA: --  eGFR N-AA: --    Calcium: 9.8  Phosphorus: --  Magnesium: 1.70    AST: 77<H>    ALT: 53<H>  AlkPhos: 55  Protein: 7.8  Albumin: 4.4  TBili: 1.7<H>  D-Bili: --        I have personally reviewed this patient's EKG and my independent interpretation is afib @ 104bpm, no STD or NELI, Q waves in II, AVF, V1-V5    Imaging reviewed:  CXR  INTERPRETATION:  No focal consolidation.  The left costophrenic angle is not well appreciated, which may reflect   overlying soft tissue, however underlying effusion is not excluded.    Review and summation of old records:  9/2021 LHC: 20% ramus lesion, 30% prox RCA  9/2021 Echo: <30% EF

## 2022-10-28 NOTE — H&P ADULT - NSICDXPASTMEDICALHX_GEN_ALL_CORE_FT
PAST MEDICAL HISTORY:  Atrial fibrillation     CAD (coronary artery disease)     Chronic systolic congestive heart failure     ETOH abuse

## 2022-10-28 NOTE — DISCHARGE NOTE PROVIDER - NSDCCPCAREPLAN_GEN_ALL_CORE_FT
PRINCIPAL DISCHARGE DIAGNOSIS  Diagnosis: Acute exacerbation of CHF (congestive heart failure)  Assessment and Plan of Treatment: Continue recommended medication regimen, fluid restriction (Less than 1.5 Liters per day). Monitor for signs/symptoms of fluid overload and electrolyte abnormalities, such as, shortness of breath, cough, swelling, chest discomfort, changes in heart rate, dizziness, fainting, or changes in mental status. Keep track of your weight and call your outpatient physician if there are abrupt changes in weight.   You recieved IV lasix while in the hospital. You were started on new medication which included, lasix, asprin, statin, eliquis, losartan, toprol.   US of the abdomen was performed, it showed :Mild hepatic steatosis.  The result of your echo was :  1. Tethered mitral valve leaflets with normal opening. Mild  mitral regurgitation.  2. Calcified trileaflet aortic valve with normal opening.  3. Severely dilated left atrium.  LA volume index = 50  cc/m2.  4. Moderate left ventricular enlargement.  5. Severe global left ventricular systolic dysfunction.  6. Right ventricular enlargement with normal right  ventricular systolic function.  Follow-up with your outpatient provider after you've been discharged from the hospital for further care/recommendations.  Patient can follow up at [facility] clinic within 1 week of discharge . Please call [phone] to make an appoitment.        SECONDARY DISCHARGE DIAGNOSES  Diagnosis: Atrial fibrillation with rapid ventricular response  Assessment and Plan of Treatment: Please continue your medications as directed and follow-up with your primary provider/cardiologist to further manage your care.   You were started on eliquis, continue the medication as ordered.  You were seen by the EP team , they have discussed the possible need for an AICD device in the future. Follow up with the EP team outpatient.   Monitor for signs/symptoms of uncontrolled atrial fibrillation, such as, increased heart rate, palpitations, chest pain, dizziness, or shortness of breath - Return to emergency room if these signs/symptoms are present.      Diagnosis: Chest pain  Assessment and Plan of Treatment: Your troponin was elevated.  You were seen by the cardiology team. They attributed the elevated troponin to the heart failure .     PRINCIPAL DISCHARGE DIAGNOSIS  Diagnosis: Acute exacerbation of CHF (congestive heart failure)  Assessment and Plan of Treatment: Continue recommended medication regimen, fluid restriction (Less than 1.5 Liters per day). Monitor for signs/symptoms of fluid overload and electrolyte abnormalities, such as, shortness of breath, cough, swelling, chest discomfort, changes in heart rate, dizziness, fainting, or changes in mental status. Keep track of your weight and call your outpatient physician if there are abrupt changes in weight.   You recieved IV lasix while in the hospital. You were started on new medication which included, lasix, asprin, statin, eliquis, losartan, toprol.   US of the abdomen was performed, it showed :Mild hepatic steatosis.  The result of your echo was :  1. Tethered mitral valve leaflets with normal opening. Mild  mitral regurgitation.  2. Calcified trileaflet aortic valve with normal opening.  3. Severely dilated left atrium.  LA volume index = 50  cc/m2.  4. Moderate left ventricular enlargement.  5. Severe global left ventricular systolic dysfunction.  6. Right ventricular enlargement with normal right  ventricular systolic function.  Follow-up with your outpatient provider after you've been discharged from the hospital for further care/recommendations.  Patient can follow up at [facility] clinic within 1 week of discharge . Please call [phone] to make an appoitment.        SECONDARY DISCHARGE DIAGNOSES  Diagnosis: ETOH abuse  Assessment and Plan of Treatment: In order to optimize your overall health and prevent adverse events, please abstain from ingesting alcohol upon discharge from  Continue to supplement with recommended vitamins and follow-up with your primary care provider for further medical care.   It is highly recommended to attend AA meetings to help create a sober lifestyle. If you need immediate assistance with substance abuse you may contact the [facility] by calling [phone].    Diagnosis: Atrial fibrillation with rapid ventricular response  Assessment and Plan of Treatment: Please continue your medications as directed and follow-up with your primary provider/cardiologist to further manage your care.   You were started on eliquis, continue the medication as ordered.  You were seen by the EP team , they have discussed the possible need for an AICD device in the future. Follow up with the EP team outpatient.   Monitor for signs/symptoms of uncontrolled atrial fibrillation, such as, increased heart rate, palpitations, chest pain, dizziness, or shortness of breath - Return to emergency room if these signs/symptoms are present.      Diagnosis: Chest pain  Assessment and Plan of Treatment: Your troponin was elevated.  You were seen by the cardiology team. They attributed the elevated troponin to the heart failure .

## 2022-10-28 NOTE — H&P ADULT - PROBLEM SELECTOR PLAN 1
Hypervolemic with LE edema, mild crackles, elevated probnp, +SOB, dec ET, orthopnea c/w acute CHF exacerbation  Has hx of HFrEF <30% EF 9/2021. Pt is noncompliant with meds for the last year. Now with afib with RVR likely triggering CHF exacerbation  -cards eval appreciated  -lasix 40 IV BID  -strict I's and O's, daily weights, keep K>4, Mg>2 (repletion ordered, f/u repeat)  -echo ordered  -hold off ACE-I for now pending cath  -hold off metoprolol for now unless persistent HR >110s  -monitor on tele

## 2022-10-28 NOTE — PROGRESS NOTE ADULT - SUBJECTIVE AND OBJECTIVE BOX
Patient is a 62y old  Male who presents with a chief complaint of SOB, LE edema x5 days (28 Oct 2022 15:46)      SUBJECTIVE / OVERNIGHT EVENTS:    No events overnight. This AM, patient without n/v/d/cp/sob.      MEDICATIONS  (STANDING):  aspirin enteric coated 81 milliGRAM(s) Oral daily  atorvastatin 80 milliGRAM(s) Oral at bedtime  furosemide   Injectable 40 milliGRAM(s) IV Push every 12 hours  heparin  Infusion.  Unit(s)/Hr (10 mL/Hr) IV Continuous <Continuous>  metoprolol tartrate 12.5 milliGRAM(s) Oral two times a day  ticagrelor 90 milliGRAM(s) Oral every 12 hours    MEDICATIONS  (PRN):  acetaminophen     Tablet .. 650 milliGRAM(s) Oral every 6 hours PRN Temp greater or equal to 38C (100.4F), Mild Pain (1 - 3)  heparin   Injectable 6000 Unit(s) IV Push every 6 hours PRN For aPTT less than 40  melatonin 3 milliGRAM(s) Oral at bedtime PRN Insomnia      PHYSICAL EXAM:  T(C): 36.7 (10-28-22 @ 15:39), Max: 36.9 (10-27-22 @ 19:16)  HR: 106 (10-28-22 @ 15:39) (73 - 128)  BP: 126/110 (10-28-22 @ 15:39) (109/95 - 138/96)  RR: 20 (10-28-22 @ 15:39) (16 - 22)  SpO2: 100% (10-28-22 @ 15:39) (96% - 100%)  I&O's Summary    GENERAL: NAD, well-developed  HEAD:  Atraumatic, Normocephalic, MMM  CHEST/LUNG: No use of accessory muscles, CTAB, breathing non-labored  COR: RR, no mrcg  ABD: Soft, ND/NT, +BS  PSYCH: AAOx3  NEUROLOGY: CN II-XII grossly intact, moving all extremities  SKIN: No rashes or lesions  EXT: wwp, no cce    LABS:  CAPILLARY BLOOD GLUCOSE                              13.2   6.73  )-----------( 243      ( 28 Oct 2022 04:33 )             39.7     10-28    139  |  98  |  12  ----------------------------<  103<H>  3.5   |  26  |  1.03    Ca    9.2      28 Oct 2022 06:28  Phos  3.7     10-28  Mg     1.90     10-28    TPro  6.7  /  Alb  4.1  /  TBili  1.8<H>  /  DBili  0.4<H>  /  AST  44<H>  /  ALT  48<H>  /  AlkPhos  62  10-28    PT/INR - ( 27 Oct 2022 19:37 )   PT: 15.7 sec;   INR: 1.35 ratio         PTT - ( 28 Oct 2022 11:24 )  PTT:50.0 sec  CARDIAC MARKERS ( 28 Oct 2022 06:28 )  x     / x     / 584 U/L / x     / 12.8 ng/mL            RADIOLOGY & ADDITIONAL TESTS:    Telemetry Personally Reviewed -     Imaging Personally Reviewed -     Imaging Reviewed -     Consultant(s) Notes Reviewed -       Care Discussed with Consultants/Other Providers -  Patient is a 62y old  Male who presents with a chief complaint of SOB, LE edema x5 days (28 Oct 2022 15:46)      SUBJECTIVE / OVERNIGHT EVENTS:    No events overnight. This AM, patient without n/v/d/cp. Patient states he has not been compliant with medications and recognizes that if he had followed the regimen, he might not be here at the hospital.   He reports SOB is improving and he is motivated to change and wants to follow the medication regimen.     MEDICATIONS  (STANDING):  aspirin enteric coated 81 milliGRAM(s) Oral daily  atorvastatin 80 milliGRAM(s) Oral at bedtime  furosemide   Injectable 40 milliGRAM(s) IV Push every 12 hours  heparin  Infusion.  Unit(s)/Hr (10 mL/Hr) IV Continuous <Continuous>  metoprolol tartrate 12.5 milliGRAM(s) Oral two times a day  ticagrelor 90 milliGRAM(s) Oral every 12 hours    MEDICATIONS  (PRN):  acetaminophen     Tablet .. 650 milliGRAM(s) Oral every 6 hours PRN Temp greater or equal to 38C (100.4F), Mild Pain (1 - 3)  heparin   Injectable 6000 Unit(s) IV Push every 6 hours PRN For aPTT less than 40  melatonin 3 milliGRAM(s) Oral at bedtime PRN Insomnia      PHYSICAL EXAM:  T(C): 36.7 (10-28-22 @ 15:39), Max: 36.9 (10-27-22 @ 19:16)  HR: 106 (10-28-22 @ 15:39) (73 - 128)  BP: 126/110 (10-28-22 @ 15:39) (109/95 - 138/96)  RR: 20 (10-28-22 @ 15:39) (16 - 22)  SpO2: 100% (10-28-22 @ 15:39) (96% - 100%)  I&O's Summary    GENERAL: NAD, well-developed  HEAD:  Atraumatic, Normocephalic, MMM  CHEST/LUNG: No use of accessory muscles, decreased breath sounds B/L, breathing non-labored  COR: Irregular Rate and rhythm, no mrcg  ABD: Soft, ND/NT, +BS  PSYCH: AAOx3  NEUROLOGY: CN II-XII grossly intact, moving all extremities  SKIN: No rashes or lesions  EXT: 2+ pitting LE edema noted B/L     LABS:  CAPILLARY BLOOD GLUCOSE                              13.2   6.73  )-----------( 243      ( 28 Oct 2022 04:33 )             39.7     10-28    139  |  98  |  12  ----------------------------<  103<H>  3.5   |  26  |  1.03    Ca    9.2      28 Oct 2022 06:28  Phos  3.7     10-28  Mg     1.90     10-28    TPro  6.7  /  Alb  4.1  /  TBili  1.8<H>  /  DBili  0.4<H>  /  AST  44<H>  /  ALT  48<H>  /  AlkPhos  62  10-28    PT/INR - ( 27 Oct 2022 19:37 )   PT: 15.7 sec;   INR: 1.35 ratio         PTT - ( 28 Oct 2022 11:24 )  PTT:50.0 sec  CARDIAC MARKERS ( 28 Oct 2022 06:28 )  x     / x     / 584 U/L / x     / 12.8 ng/mL            RADIOLOGY & ADDITIONAL TESTS:    Telemetry Personally Reviewed - Afib     Imaging Reviewed -     Consultant(s) Notes Reviewed -       Care Discussed with Consultants/Other Providers -

## 2022-10-28 NOTE — DISCHARGE NOTE PROVIDER - ATTENDING DISCHARGE PHYSICAL EXAMINATION:
Patient seen and examined at bedside. Improved SOB, able to lay flat on bed. Feels more comfortable. Discussed importance of cards and EP followup for CHF. Also importance of compliance with medications to help improve heart function. No plans for C, patient stable and appropriate for discharge       See physical exam in progress note on 11/2.

## 2022-10-28 NOTE — CHART NOTE - NSCHARTNOTEFT_GEN_A_CORE
Discussed w/ Cardiology Fellow, recommnedations as follows:  - No concern for NSTEMI at this time  - Possible plan for cath next week due to TTE with EF 10%  - Continue Heparin gtt given possible cath, can change to Full AC Nomogram for Afib AC.    Anticipating need for DOAC on discharge when inpatient workup is completed.  Prescription for Eliquis 5 mg BID sent to [facility] for price check, copay $1 for 30-day supply, primary team to call [facility] to fill script on discharge.      Africa Magana NP-BC  Department of Medicine  In House Pager #[phone]

## 2022-10-28 NOTE — ED ADULT NURSE REASSESSMENT NOTE - HEART RATE (BEATS/MIN)
September 6, 2022      [facility]  [address], [facility] [address]  [facility] [address]  Phone: [phone]  Fax: [phone]       Patient: Markus Simmons   YOB: 2022  Date of Visit: 09/06/2022    To Whom It May Concern:    Naun Simmons  was at [facility] on 09/06/2022. The patient may return to work/school on 9/7/2022 with no restrictions. If you have any questions or concerns, or if I can be of further assistance, please do not hesitate to contact me.    Sincerely,    Ewa Lee MD     
90
106
110

## 2022-10-28 NOTE — PROGRESS NOTE ADULT - PROBLEM SELECTOR PLAN 3
-110s  -if persistently >110s, start metoprolol 12.5mg BID  -heparin gtt Continue  metoprolol 12.5mg BID and heparin gtt  Continue tele monitoring

## 2022-10-28 NOTE — ED ADULT NURSE REASSESSMENT NOTE - NS ED NURSE REASSESS COMMENT FT1
Patient had 12 beats of vtach on the monitor, MD Russo given rhythm strip, patient resting in bed, HR 90s, asymptomatic
Patient placed on heparin infusion with second RN. Aware to notify RN or staff if experiencing blood in urine, nose bleeds, etc. Vitals stable. Lasix administered. Urinal at bedside. Patient aware of admission to hospital. Awaiting bed assignment. Stretcher in lowest position, wheels locked, appropriate side rails in place, call bell in reach.
Spoke to MD Pearce PTT 38.7, heparin drip ordered changed completely to ACS 1900 with bolus of 8000. Next PTT to be draw 12:05 am.
break RN: pt A&Ox4, resting comfortably in stretcher, offering no complaints at this time. heparin running at 10ml/hr. pt remains on continuous monitor. denies c/p, SOB, HA, N/V/D, weakness. respirations even and unlabored. awaiting bed assignment.
Report received from kylehipeter Durant. Patient A&Ox4, respirations even and unlabored. Patient comfortable. Vitals as noted. Continues on cardiac monitor-AFIB. Patient denies any compliants. Weight taken by PCA. Patient aware of plan for heparin infusion. Stretcher in lowest position, wheels locked, appropriate side rails in place, call bell in reach.
Patient A&Ox4, respirations even and slightly labored after ambulating. Patient continues on 2L nasal canula. Denies cp, dizziness, n/v, or any complaints at this time. Heparin adjusted per orders with second RN. Medicated per AM orders. Patient continues on cardiac monitor-AFIB. Vitals as noted. Awaiting bed assignment. Stretcher in lowest position, wheels locked, appropriate side rails in place, call bell in reach.
: Received  patient from night RN in no acute distress , continues with afib on cardiac monitor Hr fluctuates. Patient on heparin drip at 1300 with next PTT at 11:18. patient resting comfortably no bed assigned as yet continue to monitor patient. Call bell with reach instructed to call. Continue monitoring.

## 2022-10-28 NOTE — H&P ADULT - NSHPREVIEWOFSYSTEMS_GEN_ALL_CORE
ROS:    Constitutional: [ ] fevers [ ] chills   HEENT:  [ ] postnasal drip [ ] nasal congestion  CV: [ ] chest pain [ x] orthopnea [ ] palpitations [x] edema  Resp: [ ] cough [x ] shortness of breath [ x] dyspnea [ ] wheezing   GI: [ ] nausea [ ] vomiting [ ] diarrhea [ ] constipation [ ] abd pain  : [ ] dysuria  [ ] increased urinary frequency  Musculoskeletal: [ ] back pain [ ] myalgias [ ] arthralgias   Skin: [ ] rash [ ] itch  Neurological: [ ] headache [ ] dizziness [ ] syncope   Endocrine: [ ] diabetes [ ] thyroid problem  Hematologic/Lymphatic: [ ] anemia [ ] bleeding problem  [x ] All other systems negative

## 2022-10-28 NOTE — DISCHARGE NOTE PROVIDER - NSDCMRMEDTOKEN_GEN_ALL_CORE_FT
Eliquis 5 mg oral tablet: 1 tab(s) orally 2 times a day    aspirin 81 mg oral delayed release tablet: 1 tab(s) orally once a day  atorvastatin 80 mg oral tablet: 1 tab(s) orally once a day (at bedtime)  Eliquis 5 mg oral tablet: 1 tab(s) orally 2 times a day   furosemide 40 mg oral tablet: 1 tab(s) orally every 12 hours  losartan 25 mg oral tablet: 1 tab(s) orally once a day  metoprolol succinate 50 mg oral tablet, extended release: 1 tab(s) orally once a day

## 2022-10-28 NOTE — PATIENT PROFILE ADULT - FUNCTIONAL ASSESSMENT - DAILY ACTIVITY 6.
CMS/[facility]) [facility])    • Value Units Date/Time    CBC & Differential [[id number]] Henry, [facility])           Ruslan Low  09/10/18 1626       Steve Guerin MD  09/10/18 6979     4 = No assist / stand by assistance

## 2022-10-28 NOTE — H&P ADULT - PROBLEM SELECTOR PLAN 2
Troponin 129--144 in setting of CHF exacerbation and rapid Afib. Elevated cardiac enzymes may be in setting of ADHF (more likely) vs ACS. Denies CP and no acute ST changes on EKG  Last C at [place] 9/2021 with 20% ramus lesion and 30% prox RCA lesion  -tele monitoring  -s/p ASA and brillinta load at [facility]  -c/w ASA, brillinta maintenance dosing as per cards  -atorvastatin 80mg qd  -hold off BB for now given ADHF, if persistently rapid afib HR >110 start metoprolol 12.5mg BID  -heparin gtt  -f/u further cards recs regarding plans for cath. May need more diuresis to allow to lie flat. NPO pending possible cath in AM  -echo ordered

## 2022-10-28 NOTE — PROGRESS NOTE ADULT - PROBLEM SELECTOR PLAN 2
Troponin 129--144 in setting of CHF exacerbation and rapid Afib. Elevated cardiac enzymes may be in setting of ADHF (more likely) vs ACS. Denies CP and no acute ST changes on EKG  Last C at [place] 9/2021 with 20% ramus lesion and 30% prox RCA lesion  -tele monitoring  -s/p ASA and brillinta load at [facility]  -c/w ASA, brillinta maintenance dosing as per cards  -atorvastatin 80mg qd  -hold off BB for now given ADHF, if persistently rapid afib HR >110 start metoprolol 12.5mg BID  -heparin gtt  -f/u further cards recs regarding plans for cath. May need more diuresis to allow to lie flat. NPO pending possible cath in AM  -echo ordered Troponin 129--144 in setting of CHF exacerbation and rapid Afib.   Last LHC at [place] 9/2021 with 20% ramus lesion and 30% prox RCA lesion  s/p brillinta and aspirin load at [facility]     Continue tele monitoring  Continue aspirin and Brillinta. Continue statin 80mg daily   Continue Metoprolol 12.5mg BID   Switched to AC heparin gtt  NPO after midnight in anticipation of cath in am   Cards following

## 2022-10-28 NOTE — DISCHARGE NOTE PROVIDER - CARE PROVIDER_API CALL
Adia Perez)  Cardiovascular Disease; Internal Medicine  [address] [place] [address]  Phone: [phone]  Fax: [phone]  Follow Up Time: 1 week

## 2022-10-28 NOTE — PROGRESS NOTE ADULT - ASSESSMENT
62M with PMHx nonobstructive CAD, NICM (EF <30% 9/2021), ETOH abuse, Afib presenting with SOB and LE edema x5 days. Admitted for acute CHF exacerbation and likely type 2 demand ischemia/NSTEMI 62M with PMHx nonobstructive CAD, NICM (EF <30% 9/2021), ETOH abuse, Afib presenting with SOB and LE edema x5 days. Admitted for acute CHF exacerbation and likely type 2 demand ischemia/NSTEMI.     Transferred from [facility] for ?NSTEMI and need for cath,.

## 2022-10-29 LAB
ALBUMIN SERPL ELPH-MCNC: 4.2 G/DL — SIGNIFICANT CHANGE UP (ref 3.3–5)
ALP SERPL-CCNC: 60 U/L — SIGNIFICANT CHANGE UP (ref 40–120)
ALT FLD-CCNC: 49 U/L — HIGH (ref 4–41)
ANION GAP SERPL CALC-SCNC: 13 MMOL/L — SIGNIFICANT CHANGE UP (ref 7–14)
APTT BLD: 139.6 SEC — SIGNIFICANT CHANGE UP (ref 27–36.3)
APTT BLD: 49.2 SEC — HIGH (ref 27–36.3)
APTT BLD: 63.7 SEC — HIGH (ref 27–36.3)
APTT BLD: 76.6 SEC — HIGH (ref 27–36.3)
AST SERPL-CCNC: 41 U/L — HIGH (ref 4–40)
BILIRUB SERPL-MCNC: 1.5 MG/DL — HIGH (ref 0.2–1.2)
BUN SERPL-MCNC: 20 MG/DL — SIGNIFICANT CHANGE UP (ref 7–23)
CALCIUM SERPL-MCNC: 9.5 MG/DL — SIGNIFICANT CHANGE UP (ref 8.4–10.5)
CHLORIDE SERPL-SCNC: 96 MMOL/L — LOW (ref 98–107)
CO2 SERPL-SCNC: 27 MMOL/L — SIGNIFICANT CHANGE UP (ref 22–31)
CREAT SERPL-MCNC: 1.08 MG/DL — SIGNIFICANT CHANGE UP (ref 0.5–1.3)
EGFR: 77 ML/MIN/1.73M2 — SIGNIFICANT CHANGE UP
GLUCOSE SERPL-MCNC: 106 MG/DL — HIGH (ref 70–99)
HCT VFR BLD CALC: 41.7 % — SIGNIFICANT CHANGE UP (ref 39–50)
HCT VFR BLD CALC: 42.9 % — SIGNIFICANT CHANGE UP (ref 39–50)
HGB BLD-MCNC: 14 G/DL — SIGNIFICANT CHANGE UP (ref 13–17)
HGB BLD-MCNC: 14.1 G/DL — SIGNIFICANT CHANGE UP (ref 13–17)
MAGNESIUM SERPL-MCNC: 2.1 MG/DL — SIGNIFICANT CHANGE UP (ref 1.6–2.6)
MCHC RBC-ENTMCNC: 29.7 PG — SIGNIFICANT CHANGE UP (ref 27–34)
MCHC RBC-ENTMCNC: 30.1 PG — SIGNIFICANT CHANGE UP (ref 27–34)
MCHC RBC-ENTMCNC: 32.9 GM/DL — SIGNIFICANT CHANGE UP (ref 32–36)
MCHC RBC-ENTMCNC: 33.6 GM/DL — SIGNIFICANT CHANGE UP (ref 32–36)
MCV RBC AUTO: 89.7 FL — SIGNIFICANT CHANGE UP (ref 80–100)
MCV RBC AUTO: 90.5 FL — SIGNIFICANT CHANGE UP (ref 80–100)
NRBC # BLD: 0 /100 WBCS — SIGNIFICANT CHANGE UP (ref 0–0)
NRBC # BLD: 0 /100 WBCS — SIGNIFICANT CHANGE UP (ref 0–0)
NRBC # FLD: 0 K/UL — SIGNIFICANT CHANGE UP (ref 0–0)
NRBC # FLD: 0 K/UL — SIGNIFICANT CHANGE UP (ref 0–0)
PHOSPHATE SERPL-MCNC: 3.6 MG/DL — SIGNIFICANT CHANGE UP (ref 2.5–4.5)
PLATELET # BLD AUTO: 284 K/UL — SIGNIFICANT CHANGE UP (ref 150–400)
PLATELET # BLD AUTO: 286 K/UL — SIGNIFICANT CHANGE UP (ref 150–400)
POTASSIUM SERPL-MCNC: 3.7 MMOL/L — SIGNIFICANT CHANGE UP (ref 3.5–5.3)
POTASSIUM SERPL-SCNC: 3.7 MMOL/L — SIGNIFICANT CHANGE UP (ref 3.5–5.3)
PROT SERPL-MCNC: 7.2 G/DL — SIGNIFICANT CHANGE UP (ref 6–8.3)
RBC # BLD: 4.65 M/UL — SIGNIFICANT CHANGE UP (ref 4.2–5.8)
RBC # BLD: 4.74 M/UL — SIGNIFICANT CHANGE UP (ref 4.2–5.8)
RBC # FLD: 14.2 % — SIGNIFICANT CHANGE UP (ref 10.3–14.5)
RBC # FLD: 14.4 % — SIGNIFICANT CHANGE UP (ref 10.3–14.5)
SODIUM SERPL-SCNC: 136 MMOL/L — SIGNIFICANT CHANGE UP (ref 135–145)
WBC # BLD: 7.9 K/UL — SIGNIFICANT CHANGE UP (ref 3.8–10.5)
WBC # BLD: 9.09 K/UL — SIGNIFICANT CHANGE UP (ref 3.8–10.5)
WBC # FLD AUTO: 7.9 K/UL — SIGNIFICANT CHANGE UP (ref 3.8–10.5)
WBC # FLD AUTO: 9.09 K/UL — SIGNIFICANT CHANGE UP (ref 3.8–10.5)

## 2022-10-29 PROCEDURE — 99233 SBSQ HOSP IP/OBS HIGH 50: CPT | Mod: GC

## 2022-10-29 PROCEDURE — 99233 SBSQ HOSP IP/OBS HIGH 50: CPT

## 2022-10-29 RX ORDER — POTASSIUM CHLORIDE 20 MEQ
10 PACKET (EA) ORAL
Refills: 0 | Status: COMPLETED | OUTPATIENT
Start: 2022-10-29 | End: 2022-10-29

## 2022-10-29 RX ORDER — METOPROLOL TARTRATE 50 MG
25 TABLET ORAL DAILY
Refills: 0 | Status: DISCONTINUED | OUTPATIENT
Start: 2022-10-30 | End: 2022-10-30

## 2022-10-29 RX ORDER — LOSARTAN POTASSIUM 100 MG/1
25 TABLET, FILM COATED ORAL DAILY
Refills: 0 | Status: DISCONTINUED | OUTPATIENT
Start: 2022-10-30 | End: 2022-11-02

## 2022-10-29 RX ADMIN — TICAGRELOR 90 MILLIGRAM(S): 90 TABLET ORAL at 18:00

## 2022-10-29 RX ADMIN — TICAGRELOR 90 MILLIGRAM(S): 90 TABLET ORAL at 06:16

## 2022-10-29 RX ADMIN — HEPARIN SODIUM 1800 UNIT(S)/HR: 5000 INJECTION INTRAVENOUS; SUBCUTANEOUS at 15:42

## 2022-10-29 RX ADMIN — HEPARIN SODIUM 1800 UNIT(S)/HR: 5000 INJECTION INTRAVENOUS; SUBCUTANEOUS at 22:47

## 2022-10-29 RX ADMIN — ATORVASTATIN CALCIUM 80 MILLIGRAM(S): 80 TABLET, FILM COATED ORAL at 21:36

## 2022-10-29 RX ADMIN — HEPARIN SODIUM 4000 UNIT(S): 5000 INJECTION INTRAVENOUS; SUBCUTANEOUS at 15:44

## 2022-10-29 RX ADMIN — HEPARIN SODIUM 1600 UNIT(S)/HR: 5000 INJECTION INTRAVENOUS; SUBCUTANEOUS at 08:42

## 2022-10-29 RX ADMIN — HEPARIN SODIUM 1600 UNIT(S)/HR: 5000 INJECTION INTRAVENOUS; SUBCUTANEOUS at 02:09

## 2022-10-29 RX ADMIN — HEPARIN SODIUM 0 UNIT(S)/HR: 5000 INJECTION INTRAVENOUS; SUBCUTANEOUS at 01:04

## 2022-10-29 RX ADMIN — HEPARIN SODIUM 1800 UNIT(S)/HR: 5000 INJECTION INTRAVENOUS; SUBCUTANEOUS at 19:40

## 2022-10-29 RX ADMIN — Medication 40 MILLIGRAM(S): at 21:36

## 2022-10-29 RX ADMIN — Medication 100 MILLIEQUIVALENT(S): at 21:36

## 2022-10-29 RX ADMIN — Medication 40 MILLIGRAM(S): at 09:45

## 2022-10-29 RX ADMIN — Medication 81 MILLIGRAM(S): at 09:08

## 2022-10-29 RX ADMIN — HEPARIN SODIUM 1600 UNIT(S)/HR: 5000 INJECTION INTRAVENOUS; SUBCUTANEOUS at 08:21

## 2022-10-29 RX ADMIN — Medication 12.5 MILLIGRAM(S): at 06:53

## 2022-10-29 RX ADMIN — Medication 100 MILLIEQUIVALENT(S): at 20:14

## 2022-10-29 RX ADMIN — Medication 100 MILLIEQUIVALENT(S): at 18:56

## 2022-10-29 RX ADMIN — HEPARIN SODIUM 1600 UNIT(S)/HR: 5000 INJECTION INTRAVENOUS; SUBCUTANEOUS at 11:11

## 2022-10-29 NOTE — PROGRESS NOTE ADULT - PROBLEM SELECTOR PLAN 2
Troponin 129--144 in setting of CHF exacerbation and rapid Afib.   Last LHC at [place] 9/2021 with 20% ramus lesion and 30% prox RCA lesion  s/p brillinta and aspirin load at [facility]     Continue tele monitoring  Continue aspirin and Brillinta. Continue statin 80mg daily   Continue Metoprolol 12.5mg BID   Switched to AC heparin gtt  NPO after midnight in anticipation of cath in am   Cards following Troponin 129--144 in setting of CHF exacerbation and rapid Afib.   Last LHC at [place] 9/2021 with 20% ramus lesion and 30% prox RCA lesion  s/p brillinta and aspirin load at [facility]     Continue tele monitoring  Continue aspirin and Brillinta. Continue statin 80mg daily   Continue Metoprolol 12.5mg BID   Switched to AC heparin gtt  Cards following

## 2022-10-29 NOTE — PROGRESS NOTE ADULT - SUBJECTIVE AND OBJECTIVE BOX
Patient is a 62y old  Male who presents with a chief complaint of SOB, LE edema x5 days (28 Oct 2022 16:38)      SUBJECTIVE / OVERNIGHT EVENTS:    No events overnight. This AM, patient without n/v/d/cp/sob.      MEDICATIONS  (STANDING):  aspirin enteric coated 81 milliGRAM(s) Oral daily  atorvastatin 80 milliGRAM(s) Oral at bedtime  furosemide   Injectable 40 milliGRAM(s) IV Push every 12 hours  heparin  Infusion. 1900 Unit(s)/Hr (19 mL/Hr) IV Continuous <Continuous>  influenza   Vaccine 0.5 milliLiter(s) IntraMuscular once  metoprolol tartrate 12.5 milliGRAM(s) Oral two times a day  ticagrelor 90 milliGRAM(s) Oral every 12 hours    MEDICATIONS  (PRN):  acetaminophen     Tablet .. 650 milliGRAM(s) Oral every 6 hours PRN Temp greater or equal to 38C (100.4F), Mild Pain (1 - 3)  heparin   Injectable 8000 Unit(s) IV Push every 6 hours PRN For aPTT less than 40  heparin   Injectable 4000 Unit(s) IV Push every 6 hours PRN For aPTT between 40 - 57  melatonin 3 milliGRAM(s) Oral at bedtime PRN Insomnia      PHYSICAL EXAM:  T(C): 36.4 (10-29-22 @ 06:00), Max: 36.7 (10-28-22 @ 12:45)  HR: 93 (10-29-22 @ 06:00) (93 - 128)  BP: 102/78 (10-29-22 @ 06:00) (100/73 - 126/110)  RR: 18 (10-29-22 @ 06:00) (18 - 20)  SpO2: 98% (10-29-22 @ 06:00) (94% - 100%)  I&O's Summary    GENERAL: NAD, well-developed  HEAD:  Atraumatic, Normocephalic, MMM  CHEST/LUNG: No use of accessory muscles, CTAB, breathing non-labored  COR: RR, no mrcg  ABD: Soft, ND/NT, +BS  PSYCH: AAOx3  NEUROLOGY: CN II-XII grossly intact, moving all extremities  SKIN: No rashes or lesions  EXT: wwp, no cce    LABS:  CAPILLARY BLOOD GLUCOSE                              14.0   7.90  )-----------( 284      ( 29 Oct 2022 07:50 )             41.7     10-29    136  |  96<L>  |  20  ----------------------------<  106<H>  3.7   |  27  |  1.08    Ca    9.5      29 Oct 2022 07:50  Phos  3.6     10-29  Mg     2.10     10-29    TPro  7.2  /  Alb  4.2  /  TBili  1.5<H>  /  DBili  x   /  AST  41<H>  /  ALT  49<H>  /  AlkPhos  60  10-29    PT/INR - ( 27 Oct 2022 19:37 )   PT: 15.7 sec;   INR: 1.35 ratio         PTT - ( 29 Oct 2022 07:50 )  PTT:63.7 sec  CARDIAC MARKERS ( 28 Oct 2022 06:28 )  x     / x     / 584 U/L / x     / 12.8 ng/mL            RADIOLOGY & ADDITIONAL TESTS:    Telemetry Personally Reviewed -     Imaging Personally Reviewed -     Imaging Reviewed -     Consultant(s) Notes Reviewed -       Care Discussed with Consultants/Other Providers -  Patient is a 62y old  Male who presents with a chief complaint of SOB, LE edema x5 days (28 Oct 2022 16:38)      SUBJECTIVE / OVERNIGHT EVENTS:    No events overnight. This AM, patient without n/v/d/cp. Pt reports improvement in sob but still has dyspnea when he lies down.     MEDICATIONS  (STANDING):  aspirin enteric coated 81 milliGRAM(s) Oral daily  atorvastatin 80 milliGRAM(s) Oral at bedtime  furosemide   Injectable 40 milliGRAM(s) IV Push every 12 hours  heparin  Infusion. 1900 Unit(s)/Hr (19 mL/Hr) IV Continuous <Continuous>  influenza   Vaccine 0.5 milliLiter(s) IntraMuscular once  metoprolol tartrate 12.5 milliGRAM(s) Oral two times a day  ticagrelor 90 milliGRAM(s) Oral every 12 hours    MEDICATIONS  (PRN):  acetaminophen     Tablet .. 650 milliGRAM(s) Oral every 6 hours PRN Temp greater or equal to 38C (100.4F), Mild Pain (1 - 3)  heparin   Injectable 8000 Unit(s) IV Push every 6 hours PRN For aPTT less than 40  heparin   Injectable 4000 Unit(s) IV Push every 6 hours PRN For aPTT between 40 - 57  melatonin 3 milliGRAM(s) Oral at bedtime PRN Insomnia      PHYSICAL EXAM:  T(C): 36.4 (10-29-22 @ 06:00), Max: 36.7 (10-28-22 @ 12:45)  HR: 93 (10-29-22 @ 06:00) (93 - 128)  BP: 102/78 (10-29-22 @ 06:00) (100/73 - 126/110)  RR: 18 (10-29-22 @ 06:00) (18 - 20)  SpO2: 98% (10-29-22 @ 06:00) (94% - 100%)  I&O's Summary    GENERAL: NAD, well-developed  HEAD:  Atraumatic, Normocephalic, MMM  CHEST/LUNG: No use of accessory muscles, decreased breath sounds B/L, breathing non-labored  COR: Irregular Rate and rhythm, no mrcg  ABD: Soft, ND/NT, +BS  PSYCH: AAOx3  NEUROLOGY: CN II-XII grossly intact, moving all extremities  SKIN: No rashes or lesions  EXT: 2+ pitting LE edema noted B/L       LABS:  CAPILLARY BLOOD GLUCOSE                              14.0   7.90  )-----------( 284      ( 29 Oct 2022 07:50 )             41.7     10-29    136  |  96<L>  |  20  ----------------------------<  106<H>  3.7   |  27  |  1.08    Ca    9.5      29 Oct 2022 07:50  Phos  3.6     10-29  Mg     2.10     10-29    TPro  7.2  /  Alb  4.2  /  TBili  1.5<H>  /  DBili  x   /  AST  41<H>  /  ALT  49<H>  /  AlkPhos  60  10-29    PT/INR - ( 27 Oct 2022 19:37 )   PT: 15.7 sec;   INR: 1.35 ratio         PTT - ( 29 Oct 2022 07:50 )  PTT:63.7 sec  CARDIAC MARKERS ( 28 Oct 2022 06:28 )  x     / x     / 584 U/L / x     / 12.8 ng/mL            RADIOLOGY & ADDITIONAL TESTS:    Telemetry Personally Reviewed -     Imaging Personally Reviewed -     Imaging Reviewed -     Consultant(s) Notes Reviewed -       Care Discussed with Consultants/Other Providers -

## 2022-10-29 NOTE — PROGRESS NOTE ADULT - SUBJECTIVE AND OBJECTIVE BOX
Patient seen and examined at bedside.    Overnight Events:     Feels well   Breathing improving     REVIEW OF SYSTEMS:  Constitutional:     [x ] negative [ ] fevers [ ] chills [ ] weight loss [ ] weight gain  HEENT:                  [x ] negative [ ] dry eyes [ ] eye irritation [ ] postnasal drip [ ] nasal congestion  CV:                         [ x] negative  [ ] chest pain [ ] orthopnea [ ] palpitations [ ] murmur  Resp:                     [ x] negative [ ] cough [ ] shortness of breath [ ] dyspnea [ ] wheezing [ ] sputum [ ]hemoptysis  GI:                          [ x] negative [ ] nausea [ ] vomiting [ ] diarrhea [ ] constipation [ ] abd pain [ ] dysphagia   :                        [ x] negative [ ] dysuria [ ] nocturia [ ] hematuria [ ] increased urinary frequency  Musculoskeletal: [ x] negative [ ] back pain [ ] myalgias [ ] arthralgias [ ] fracture  Skin:                       [ x] negative [ ] rash [ ] itch  Neurological:        [x ] negative [ ] headache [ ] dizziness [ ] syncope [ ] weakness [ ] numbness  Psychiatric:           [ x] negative [ ] anxiety [ ] depression  Endocrine:            [ x] negative [ ] diabetes [ ] thyroid problem  Heme/Lymph:      [ x] negative [ ] anemia [ ] bleeding problem  Allergic/Immune: [ x] negative [ ] itchy eyes [ ] nasal discharge [ ] hives [ ] angioedema    [ x] All other systems negative  [ ] Unable to assess ROS due to    Current Meds:  acetaminophen     Tablet .. 650 milliGRAM(s) Oral every 6 hours PRN  aspirin enteric coated 81 milliGRAM(s) Oral daily  atorvastatin 80 milliGRAM(s) Oral at bedtime  furosemide   Injectable 40 milliGRAM(s) IV Push every 12 hours  heparin   Injectable 8000 Unit(s) IV Push every 6 hours PRN  heparin   Injectable 4000 Unit(s) IV Push every 6 hours PRN  heparin  Infusion. 1900 Unit(s)/Hr IV Continuous <Continuous>  influenza   Vaccine 0.5 milliLiter(s) IntraMuscular once  melatonin 3 milliGRAM(s) Oral at bedtime PRN  metoprolol tartrate 12.5 milliGRAM(s) Oral two times a day  ticagrelor 90 milliGRAM(s) Oral every 12 hours      PAST MEDICAL & SURGICAL HISTORY:  Chronic systolic congestive heart failure      CAD (coronary artery disease)      ETOH abuse      Atrial fibrillation      No significant past surgical history          Vitals:  T(F): 97.6 (10-29), Max: 98 (10-28)  HR: 95 (10-29) (93 - 128)  BP: 98/62 (10-29) (93/65 - 126/110)  RR: 18 (10-29)  SpO2: 100% (10-29)  I&O's Summary      Physical Exam:  Appearance: No acute distress  HENT: JVD 12 cm   Cardiovascular: RRR, S1/S2, no murmurs  Respiratory: CTABL  Gastrointestinal: soft, NT ND, +BS  Musculoskeletal: No clubbing, 1+ edema   Neurologic: Non-focal  Skin: No rashes, ecchymoses, or cyanosis                          14.0   7.90  )-----------( 284      ( 29 Oct 2022 07:50 )             41.7     10-29    136  |  96<L>  |  20  ----------------------------<  106<H>  3.7   |  27  |  1.08    Ca    9.5      29 Oct 2022 07:50  Phos  3.6     10-29  Mg     2.10     10-29    TPro  7.2  /  Alb  4.2  /  TBili  1.5<H>  /  DBili  x   /  AST  41<H>  /  ALT  49<H>  /  AlkPhos  60  10-29    PT/INR - ( 27 Oct 2022 19:37 )   PT: 15.7 sec;   INR: 1.35 ratio         PTT - ( 29 Oct 2022 07:50 )  PTT:63.7 sec  CARDIAC MARKERS ( 28 Oct 2022 06:28 )  x     / x     / 584 U/L / x     / 12.8 ng/mL      Serum Pro-Brain Natriuretic Peptide: 4021 pg/mL (10-27 @ 19:37)          Cardiovascular Testings:   CONCLUSIONS:  1. Tethered mitral valve leaflets with normal opening. Mild  mitral regurgitation.  2. Calcified trileaflet aortic valve with normal opening.  3. Severely dilated left atrium.  LA volume index = 50  cc/m2.  4. Moderate left ventricular enlargement.  5. Severe global left ventricular systolic dysfunction.  6. Right ventricular enlargement with normal right  ventricular systolic function.      Interpretation of Telemetry: AF

## 2022-10-29 NOTE — PROGRESS NOTE ADULT - PROBLEM SELECTOR PLAN 1
on admission,  LE edema, mild crackles, elevated BNP, +SOB, orthopnea   hx of HFrEF <30% EF 9/2021. Pt is noncompliant with meds for the last year.   Now with afib with RVR likely causing CHF exacerbation  TTE today showed EF 10% Severely dilated left atrium.  Moderate left ventricular enlargement.  Severe global left ventricular systolic dysfunction. Right ventricular enlargement with normal right  ventricular systolic function.    Continue IV Lasix 40mg BID   will keep NPO after midnight in anticipation of cath in am. continue hep gtt for now   Strict I/O, daily weight. Monitor UOP  Keep K>4 and Mg>2  hold off ACE-I for now pending cath  continue tele monitoring   Cards following on admission,  LE edema, mild crackles, elevated BNP, +SOB, orthopnea   hx of HFrEF <30% EF 9/2021. Pt is noncompliant with meds for the last year.   Now with afib with RVR likely causing CHF exacerbation  TTE today showed EF 10% Severely dilated left atrium.  Moderate left ventricular enlargement.  Severe global left ventricular systolic dysfunction. Right ventricular enlargement with normal right  ventricular systolic function.    Continue IV Lasix 40mg BID   continue hep gtt for now. Possible Cath on Monday   Strict I/O, daily weight. Monitor UOP  HF consulted and recommend starting Losartan, switching to Toprol   Keep K>4 and Mg>2  hold off ACE-I for now pending cath  continue tele monitoring   Cards following

## 2022-10-29 NOTE — PROVIDER CONTACT NOTE (OTHER) - RECOMMENDATIONS
As per provider Miriam Smith (#[id number]), ok to perform EKG while patient is standing. RN will continue to monitor.
As per provider Valeri Vargas (#[id number]) recheck BP in an hour and hold metoprolol dose at this time. RN will continue to monitor.
As per provider Valeri Vargas (#[id number]) will consult cardiology in regards to administering Lasix at this time. RN will continue to monitor.
As per provider Valeri Vargas (#[id number]) RN may administer Lasix dose at this time. RN will continue to monitor.
As per provider Valeri Vargas (#[id number]) will review the patient's chart to determine if he needs supplemental oxygen. RN will continue to monitor.
As per provider Valeri Vargas (#[id number]) obtain a 12 lead EKG. RN will continue to monitor.

## 2022-10-29 NOTE — PROGRESS NOTE ADULT - ASSESSMENT
62M with PMHx nonobstructive CAD, NICM (EF <30% 9/2021), ETOH abuse, Afib presenting with SOB and LE edema x5 days. Admitted for acute CHF exacerbation and likely type 2 demand ischemia/NSTEMI.     Transferred from [facility] for ?NSTEMI and need for cath,.

## 2022-10-29 NOTE — PROGRESS NOTE ADULT - PROBLEM SELECTOR PLAN 3
Continue  metoprolol 12.5mg BID and heparin gtt  Continue tele monitoring switched to toprol 50mg daily and heparin gtt  Continue tele monitoring

## 2022-10-29 NOTE — PROGRESS NOTE ADULT - ASSESSMENT
62M with PMH EtOH abuse (quitted 2 month ago, 6 pack beer daily prior to that), NYHA Class 2 HFrEF 2/2 EtOH NICM ([facility] 2021 with 20% ramus and 30% pRCA lesion) and pAF admitted with ADHF     Clinically, his presentation is less likely to be ischemic in etiology. Would not trend troponin further   Remains fluid overloaded, cont IV lasix 40 mg BID and monitor I/O, telemetry and electrolytes  Continue metoprolol 12.5 mg BID and AC given his CHADVASC >2   Consider rhythm control strategy when compensated   Will need to optimize his GDMT when compensated   ICD eval as outpatient on optimized GDMT  No urgency for ischemic eval, can consider it next week     Thank you for allowing us to participate in the care of your patient. If you have any questions or concerns please do not hesitate to contact us 24/7.     All Cardiology service information can be found 24/7 on [email or web address], password: [email or web address] Fall MD  PGY-6 Cardiology Fellow, [facility]MICHAEL

## 2022-10-30 LAB
ANION GAP SERPL CALC-SCNC: 14 MMOL/L — SIGNIFICANT CHANGE UP (ref 7–14)
APTT BLD: 54.9 SEC — HIGH (ref 27–36.3)
APTT BLD: 71.4 SEC — HIGH (ref 27–36.3)
BUN SERPL-MCNC: 22 MG/DL — SIGNIFICANT CHANGE UP (ref 7–23)
CALCIUM SERPL-MCNC: 9.5 MG/DL — SIGNIFICANT CHANGE UP (ref 8.4–10.5)
CHLORIDE SERPL-SCNC: 97 MMOL/L — LOW (ref 98–107)
CO2 SERPL-SCNC: 25 MMOL/L — SIGNIFICANT CHANGE UP (ref 22–31)
CREAT SERPL-MCNC: 1.03 MG/DL — SIGNIFICANT CHANGE UP (ref 0.5–1.3)
EGFR: 82 ML/MIN/1.73M2 — SIGNIFICANT CHANGE UP
GLUCOSE SERPL-MCNC: 118 MG/DL — HIGH (ref 70–99)
HCT VFR BLD CALC: 40.7 % — SIGNIFICANT CHANGE UP (ref 39–50)
HCV AB S/CO SERPL IA: 0.1 S/CO — SIGNIFICANT CHANGE UP (ref 0–0.99)
HCV AB SERPL-IMP: SIGNIFICANT CHANGE UP
HGB BLD-MCNC: 13.8 G/DL — SIGNIFICANT CHANGE UP (ref 13–17)
MAGNESIUM SERPL-MCNC: 2 MG/DL — SIGNIFICANT CHANGE UP (ref 1.6–2.6)
MCHC RBC-ENTMCNC: 29.9 PG — SIGNIFICANT CHANGE UP (ref 27–34)
MCHC RBC-ENTMCNC: 33.9 GM/DL — SIGNIFICANT CHANGE UP (ref 32–36)
MCV RBC AUTO: 88.3 FL — SIGNIFICANT CHANGE UP (ref 80–100)
NRBC # BLD: 0 /100 WBCS — SIGNIFICANT CHANGE UP (ref 0–0)
NRBC # FLD: 0 K/UL — SIGNIFICANT CHANGE UP (ref 0–0)
PHOSPHATE SERPL-MCNC: 4.4 MG/DL — SIGNIFICANT CHANGE UP (ref 2.5–4.5)
PLATELET # BLD AUTO: 266 K/UL — SIGNIFICANT CHANGE UP (ref 150–400)
POTASSIUM SERPL-MCNC: 4 MMOL/L — SIGNIFICANT CHANGE UP (ref 3.5–5.3)
POTASSIUM SERPL-SCNC: 4 MMOL/L — SIGNIFICANT CHANGE UP (ref 3.5–5.3)
RBC # BLD: 4.61 M/UL — SIGNIFICANT CHANGE UP (ref 4.2–5.8)
RBC # FLD: 14.3 % — SIGNIFICANT CHANGE UP (ref 10.3–14.5)
SODIUM SERPL-SCNC: 136 MMOL/L — SIGNIFICANT CHANGE UP (ref 135–145)
WBC # BLD: 8.11 K/UL — SIGNIFICANT CHANGE UP (ref 3.8–10.5)
WBC # FLD AUTO: 8.11 K/UL — SIGNIFICANT CHANGE UP (ref 3.8–10.5)

## 2022-10-30 PROCEDURE — 99233 SBSQ HOSP IP/OBS HIGH 50: CPT

## 2022-10-30 RX ORDER — METOPROLOL TARTRATE 50 MG
50 TABLET ORAL DAILY
Refills: 0 | Status: DISCONTINUED | OUTPATIENT
Start: 2022-10-30 | End: 2022-11-02

## 2022-10-30 RX ADMIN — Medication 25 MILLIGRAM(S): at 05:44

## 2022-10-30 RX ADMIN — Medication 81 MILLIGRAM(S): at 12:48

## 2022-10-30 RX ADMIN — TICAGRELOR 90 MILLIGRAM(S): 90 TABLET ORAL at 05:44

## 2022-10-30 RX ADMIN — Medication 40 MILLIGRAM(S): at 22:15

## 2022-10-30 RX ADMIN — TICAGRELOR 90 MILLIGRAM(S): 90 TABLET ORAL at 18:24

## 2022-10-30 RX ADMIN — HEPARIN SODIUM 1800 UNIT(S)/HR: 5000 INJECTION INTRAVENOUS; SUBCUTANEOUS at 04:19

## 2022-10-30 RX ADMIN — Medication 40 MILLIGRAM(S): at 12:51

## 2022-10-30 RX ADMIN — HEPARIN SODIUM 2000 UNIT(S)/HR: 5000 INJECTION INTRAVENOUS; SUBCUTANEOUS at 17:41

## 2022-10-30 RX ADMIN — HEPARIN SODIUM 2000 UNIT(S)/HR: 5000 INJECTION INTRAVENOUS; SUBCUTANEOUS at 08:59

## 2022-10-30 RX ADMIN — HEPARIN SODIUM 2000 UNIT(S)/HR: 5000 INJECTION INTRAVENOUS; SUBCUTANEOUS at 19:34

## 2022-10-30 RX ADMIN — ATORVASTATIN CALCIUM 80 MILLIGRAM(S): 80 TABLET, FILM COATED ORAL at 22:15

## 2022-10-30 RX ADMIN — LOSARTAN POTASSIUM 25 MILLIGRAM(S): 100 TABLET, FILM COATED ORAL at 05:44

## 2022-10-30 RX ADMIN — HEPARIN SODIUM 2000 UNIT(S)/HR: 5000 INJECTION INTRAVENOUS; SUBCUTANEOUS at 08:57

## 2022-10-30 NOTE — PROGRESS NOTE ADULT - PROBLEM SELECTOR PLAN 2
Troponin 129--144 in setting of CHF exacerbation and rapid Afib.   Last LHC at [place] 9/2021 with 20% ramus lesion and 30% prox RCA lesion  s/p brillinta and aspirin load at [facility]     Continue tele monitoring  Continue aspirin and Brillinta. Continue statin 80mg daily   Continue Metoprolol 12.5mg BID   Switched to AC heparin gtt  Cards following Troponin 129--144 in setting of CHF exacerbation and rapid Afib.   Last LHC at [place] 9/2021 with 20% ramus lesion and 30% prox RCA lesion  s/p brillinta and aspirin load at [facility]     Continue tele monitoring  Continue aspirin and Brillinta. Continue statin 80mg daily   Continue Metoprolol   Switched to AC heparin gtt  Cards following

## 2022-10-30 NOTE — PROGRESS NOTE ADULT - PROBLEM SELECTOR PLAN 5
DVT ppx: already on heparin gtt  dispo: pending DVT ppx: already on heparin gtt  dispo: pending Cath

## 2022-10-30 NOTE — PROGRESS NOTE ADULT - SUBJECTIVE AND OBJECTIVE BOX
Cardiology fellow progress note    Subjective:  NAEO  ROS negative    Current Meds:  acetaminophen     Tablet .. 650 milliGRAM(s) Oral every 6 hours PRN  aspirin enteric coated 81 milliGRAM(s) Oral daily  atorvastatin 80 milliGRAM(s) Oral at bedtime  furosemide   Injectable 40 milliGRAM(s) IV Push every 12 hours  heparin   Injectable 8000 Unit(s) IV Push every 6 hours PRN  heparin   Injectable 4000 Unit(s) IV Push every 6 hours PRN  heparin  Infusion. 1900 Unit(s)/Hr IV Continuous <Continuous>  influenza   Vaccine 0.5 milliLiter(s) IntraMuscular once  melatonin 3 milliGRAM(s) Oral at bedtime PRN  metoprolol tartrate 12.5 milliGRAM(s) Oral two times a day  ticagrelor 90 milliGRAM(s) Oral every 12 hour      Vitals:  T(F): 97.6 (10-29), Max: 98 (10-28)  HR: 95 (10-29) (93 - 128)  BP: 98/62 (10-29) (93/65 - 126/110)  RR: 18 (10-29)  SpO2: 100% (10-29)  I&O's Summary      Physical Exam:  Appearance: No acute distress  HENT: JVD 12 cm   Cardiovascular: RRR, S1/S2, no murmurs  Respiratory: CTABL  Gastrointestinal: soft, NT ND, +BS  Musculoskeletal: No clubbing, 1+ edema   Neurologic: Non-focal  Skin: No rashes, ecchymoses, or cyanosis                          14.0   7.90  )-----------( 284      ( 29 Oct 2022 07:50 )             41.7     10-29    136  |  96<L>  |  20  ----------------------------<  106<H>  3.7   |  27  |  1.08    Ca    9.5      29 Oct 2022 07:50  Phos  3.6     10-29  Mg     2.10     10-29    TPro  7.2  /  Alb  4.2  /  TBili  1.5<H>  /  DBili  x   /  AST  41<H>  /  ALT  49<H>  /  AlkPhos  60  10-29    PT/INR - ( 27 Oct 2022 19:37 )   PT: 15.7 sec;   INR: 1.35 ratio         PTT - ( 29 Oct 2022 07:50 )  PTT:63.7 sec  CARDIAC MARKERS ( 28 Oct 2022 06:28 )  x     / x     / 584 U/L / x     / 12.8 ng/mL      Serum Pro-Brain Natriuretic Peptide: 4021 pg/mL (10-27 @ 19:37)          Cardiovascular Testings:   CONCLUSIONS:  1. Tethered mitral valve leaflets with normal opening. Mild  mitral regurgitation.  2. Calcified trileaflet aortic valve with normal opening.  3. Severely dilated left atrium.  LA volume index = 50  cc/m2.  4. Moderate left ventricular enlargement.  5. Severe global left ventricular systolic dysfunction.  6. Right ventricular enlargement with normal right  ventricular systolic function.      Interpretation of Telemetry: AF

## 2022-10-30 NOTE — PROGRESS NOTE ADULT - PROBLEM SELECTOR PLAN 1
on admission,  LE edema, mild crackles, elevated BNP, +SOB, orthopnea   hx of HFrEF <30% EF 9/2021. Pt is noncompliant with meds for the last year.   Now with afib with RVR likely causing CHF exacerbation  TTE today showed EF 10% Severely dilated left atrium.  Moderate left ventricular enlargement.  Severe global left ventricular systolic dysfunction. Right ventricular enlargement with normal right  ventricular systolic function.    Continue IV Lasix 40mg BID   continue hep gtt for now. Possible Cath on Monday   Strict I/O, daily weight. Monitor UOP  HF consulted and recommend starting Losartan, switching to Toprol   Keep K>4 and Mg>2  hold off ACE-I for now pending cath  continue tele monitoring   Cards following on admission,  LE edema, mild crackles, elevated BNP, +SOB, orthopnea   hx of HFrEF <30% EF 9/2021. Pt is noncompliant with meds for the last year.   Now with afib with RVR likely causing CHF exacerbation  TTE showed EF 10% Severely dilated left atrium.  Moderate left ventricular enlargement.  Severe global left ventricular systolic dysfunction. Right ventricular enlargement with normal right  ventricular systolic function.    Continue IV Lasix 40mg BID   continue hep gtt for now. Possible Cath on Monday. will keep NPO for now in anticipation  Strict I/O, daily weight. Monitor UOP  HF consulted and recommend starting Losartan, switching to Toprol   Keep K>4 and Mg>2  continue tele monitoring   Cards following

## 2022-10-30 NOTE — PROGRESS NOTE ADULT - SUBJECTIVE AND OBJECTIVE BOX
Patient is a 62y old  Male who presents with a chief complaint of SOB, LE edema x5 days (30 Oct 2022 09:14)      SUBJECTIVE / OVERNIGHT EVENTS:    No events overnight. This AM, patient without n/v/d/cp/sob.      MEDICATIONS  (STANDING):  aspirin enteric coated 81 milliGRAM(s) Oral daily  atorvastatin 80 milliGRAM(s) Oral at bedtime  furosemide   Injectable 40 milliGRAM(s) IV Push every 12 hours  heparin  Infusion. 1900 Unit(s)/Hr (19 mL/Hr) IV Continuous <Continuous>  influenza   Vaccine 0.5 milliLiter(s) IntraMuscular once  losartan 25 milliGRAM(s) Oral daily  metoprolol succinate ER 50 milliGRAM(s) Oral daily  ticagrelor 90 milliGRAM(s) Oral every 12 hours    MEDICATIONS  (PRN):  acetaminophen     Tablet .. 650 milliGRAM(s) Oral every 6 hours PRN Temp greater or equal to 38C (100.4F), Mild Pain (1 - 3)  heparin   Injectable 8000 Unit(s) IV Push every 6 hours PRN For aPTT less than 40  heparin   Injectable 4000 Unit(s) IV Push every 6 hours PRN For aPTT between 40 - 57  melatonin 3 milliGRAM(s) Oral at bedtime PRN Insomnia      PHYSICAL EXAM:  T(C): 36.5 (10-30-22 @ 04:43), Max: 36.5 (10-30-22 @ 04:43)  HR: 111 (10-30-22 @ 04:43) (111 - 119)  BP: 119/80 (10-30-22 @ 04:43) (111/82 - 119/80)  RR: 20 (10-30-22 @ 04:43) (18 - 20)  SpO2: 99% (10-30-22 @ 04:43) (98% - 99%)  I&O's Summary    29 Oct 2022 07:01  -  30 Oct 2022 07:00  --------------------------------------------------------  IN: 650 mL / OUT: 800 mL / NET: -150 mL    30 Oct 2022 07:01  -  30 Oct 2022 18:18  --------------------------------------------------------  IN: 853 mL / OUT: 600 mL / NET: 253 mL      GENERAL: NAD, well-developed  HEAD:  Atraumatic, Normocephalic, MMM  CHEST/LUNG: No use of accessory muscles, CTAB, breathing non-labored  COR: RR, no mrcg  ABD: Soft, ND/NT, +BS  PSYCH: AAOx3  NEUROLOGY: CN II-XII grossly intact, moving all extremities  SKIN: No rashes or lesions  EXT: wwp, no cce    LABS:  CAPILLARY BLOOD GLUCOSE                              13.8   8.11  )-----------( 266      ( 30 Oct 2022 05:10 )             40.7     10-30    136  |  97<L>  |  22  ----------------------------<  118<H>  4.0   |  25  |  1.03    Ca    9.5      30 Oct 2022 16:40  Phos  4.4     10-30  Mg     2.00     10-30    TPro  7.2  /  Alb  4.2  /  TBili  1.5<H>  /  DBili  x   /  AST  41<H>  /  ALT  49<H>  /  AlkPhos  60  10-29    PTT - ( 30 Oct 2022 16:40 )  PTT:71.4 sec            RADIOLOGY & ADDITIONAL TESTS:    Telemetry Personally Reviewed -     Imaging Personally Reviewed -     Imaging Reviewed -     Consultant(s) Notes Reviewed -       Care Discussed with Consultants/Other Providers -  Patient is a 62y old  Male who presents with a chief complaint of SOB, LE edema x5 days (30 Oct 2022 09:14)      SUBJECTIVE / OVERNIGHT EVENTS:    No events overnight. This AM, patient without n/v/d/cp. Patient reports he is still having shortness of breath and having difficulty lying down. He reports sob is improving.     MEDICATIONS  (STANDING):  aspirin enteric coated 81 milliGRAM(s) Oral daily  atorvastatin 80 milliGRAM(s) Oral at bedtime  furosemide   Injectable 40 milliGRAM(s) IV Push every 12 hours  heparin  Infusion. 1900 Unit(s)/Hr (19 mL/Hr) IV Continuous <Continuous>  influenza   Vaccine 0.5 milliLiter(s) IntraMuscular once  losartan 25 milliGRAM(s) Oral daily  metoprolol succinate ER 50 milliGRAM(s) Oral daily  ticagrelor 90 milliGRAM(s) Oral every 12 hours    MEDICATIONS  (PRN):  acetaminophen     Tablet .. 650 milliGRAM(s) Oral every 6 hours PRN Temp greater or equal to 38C (100.4F), Mild Pain (1 - 3)  heparin   Injectable 8000 Unit(s) IV Push every 6 hours PRN For aPTT less than 40  heparin   Injectable 4000 Unit(s) IV Push every 6 hours PRN For aPTT between 40 - 57  melatonin 3 milliGRAM(s) Oral at bedtime PRN Insomnia      PHYSICAL EXAM:  T(C): 36.5 (10-30-22 @ 04:43), Max: 36.5 (10-30-22 @ 04:43)  HR: 111 (10-30-22 @ 04:43) (111 - 119)  BP: 119/80 (10-30-22 @ 04:43) (111/82 - 119/80)  RR: 20 (10-30-22 @ 04:43) (18 - 20)  SpO2: 99% (10-30-22 @ 04:43) (98% - 99%)  I&O's Summary    29 Oct 2022 07:01  -  30 Oct 2022 07:00  --------------------------------------------------------  IN: 650 mL / OUT: 800 mL / NET: -150 mL    30 Oct 2022 07:01  -  30 Oct 2022 18:18  --------------------------------------------------------  IN: 853 mL / OUT: 600 mL / NET: 253 mL      GENERAL: NAD, well-developed  HEAD:  Atraumatic, Normocephalic, MMM  CHEST/LUNG: No use of accessory muscles, decreased breath sounds B/L, breathing non-labored  COR: Irregular Rate and rhythm, no mrcg  ABD: Soft, ND/NT, +BS  PSYCH: AAOx3  NEUROLOGY: CN II-XII grossly intact, moving all extremities  SKIN: No rashes or lesions  EXT: 2+ pitting LE edema noted B/L    LABS:                            13.8   8.11  )-----------( 266      ( 30 Oct 2022 05:10 )             40.7     10-30    136  |  97<L>  |  22  ----------------------------<  118<H>  4.0   |  25  |  1.03    Ca    9.5      30 Oct 2022 16:40  Phos  4.4     10-30  Mg     2.00     10-30    TPro  7.2  /  Alb  4.2  /  TBili  1.5<H>  /  DBili  x   /  AST  41<H>  /  ALT  49<H>  /  AlkPhos  60  10-29    PTT - ( 30 Oct 2022 16:40 )  PTT:71.4 sec            RADIOLOGY & ADDITIONAL TESTS:    Telemetry Personally Reviewed -    Imaging Reviewed -     Consultant(s) Notes Reviewed -       Care Discussed with Consultants/Other Providers -

## 2022-10-30 NOTE — PROGRESS NOTE ADULT - ASSESSMENT
62M with PMH EtOH abuse (quitted 2 month ago, 6 pack beer daily prior to that), NYHA Class 2 HFrEF 2/2 EtOH NICM ([facility] 2021 with 20% ramus and 30% pRCA lesion) and pAF admitted with ADHF     #ADHF  Clinically, his presentation is less likely to be ischemic in etiology. Would not trend troponin further   Remains fluid overloaded, cont IV lasix 40 mg BID and monitor I/O, telemetry and electrolytes  GDMT: continue losartan 25mg daily and toprol XL 25mg daily  Strict I/O & Daily standing weight  ICD eval as outpatient on optimized GDMT  Consider ischemic eval once compensated    #Paroxysmal Afib  BB as above  AC given his CHADVASC >2   Consider rhythm control strategy when compensated     Canelo Grace MD  PGY6 Cardiology

## 2022-10-30 NOTE — PROGRESS NOTE ADULT - ASSESSMENT
62M with PMHx nonobstructive CAD, NICM (EF <30% 9/2021), ETOH abuse, Afib presenting with SOB and LE edema x5 days. Admitted for acute CHF exacerbation and likely type 2 demand ischemia/NSTEMI.     Transferred from [facility] for ?NSTEMI and need for cath,.  62M with PMHx nonobstructive CAD, NICM (EF <30% 9/2021), ETOH abuse, Afib presenting with SOB and LE edema x5 days. Admitted for acute CHF exacerbation and likely type 2 demand ischemia/NSTEMI.     Transferred from [facility] for ?NSTEMI and need for cath.

## 2022-10-31 LAB
ANION GAP SERPL CALC-SCNC: 11 MMOL/L — SIGNIFICANT CHANGE UP (ref 7–14)
APTT BLD: 87.9 SEC — HIGH (ref 27–36.3)
BUN SERPL-MCNC: 19 MG/DL — SIGNIFICANT CHANGE UP (ref 7–23)
CALCIUM SERPL-MCNC: 9.5 MG/DL — SIGNIFICANT CHANGE UP (ref 8.4–10.5)
CHLORIDE SERPL-SCNC: 95 MMOL/L — LOW (ref 98–107)
CO2 SERPL-SCNC: 27 MMOL/L — SIGNIFICANT CHANGE UP (ref 22–31)
CREAT SERPL-MCNC: 1.08 MG/DL — SIGNIFICANT CHANGE UP (ref 0.5–1.3)
EGFR: 77 ML/MIN/1.73M2 — SIGNIFICANT CHANGE UP
GLUCOSE SERPL-MCNC: 101 MG/DL — HIGH (ref 70–99)
HCT VFR BLD CALC: 39.8 % — SIGNIFICANT CHANGE UP (ref 39–50)
HGB BLD-MCNC: 13.6 G/DL — SIGNIFICANT CHANGE UP (ref 13–17)
INR BLD: 1.17 RATIO — HIGH (ref 0.88–1.16)
MAGNESIUM SERPL-MCNC: 2 MG/DL — SIGNIFICANT CHANGE UP (ref 1.6–2.6)
MCHC RBC-ENTMCNC: 30.1 PG — SIGNIFICANT CHANGE UP (ref 27–34)
MCHC RBC-ENTMCNC: 34.2 GM/DL — SIGNIFICANT CHANGE UP (ref 32–36)
MCV RBC AUTO: 88.1 FL — SIGNIFICANT CHANGE UP (ref 80–100)
NRBC # BLD: 0 /100 WBCS — SIGNIFICANT CHANGE UP (ref 0–0)
NRBC # FLD: 0 K/UL — SIGNIFICANT CHANGE UP (ref 0–0)
PHOSPHATE SERPL-MCNC: 4.2 MG/DL — SIGNIFICANT CHANGE UP (ref 2.5–4.5)
PLATELET # BLD AUTO: 277 K/UL — SIGNIFICANT CHANGE UP (ref 150–400)
POTASSIUM SERPL-MCNC: 3.7 MMOL/L — SIGNIFICANT CHANGE UP (ref 3.5–5.3)
POTASSIUM SERPL-SCNC: 3.7 MMOL/L — SIGNIFICANT CHANGE UP (ref 3.5–5.3)
PROTHROM AB SERPL-ACNC: 13.6 SEC — HIGH (ref 10.5–13.4)
RBC # BLD: 4.52 M/UL — SIGNIFICANT CHANGE UP (ref 4.2–5.8)
RBC # FLD: 14.3 % — SIGNIFICANT CHANGE UP (ref 10.3–14.5)
SODIUM SERPL-SCNC: 133 MMOL/L — LOW (ref 135–145)
WBC # BLD: 7.55 K/UL — SIGNIFICANT CHANGE UP (ref 3.8–10.5)
WBC # FLD AUTO: 7.55 K/UL — SIGNIFICANT CHANGE UP (ref 3.8–10.5)

## 2022-10-31 PROCEDURE — 99233 SBSQ HOSP IP/OBS HIGH 50: CPT

## 2022-10-31 PROCEDURE — 99232 SBSQ HOSP IP/OBS MODERATE 35: CPT | Mod: GC

## 2022-10-31 RX ADMIN — HEPARIN SODIUM 2000 UNIT(S)/HR: 5000 INJECTION INTRAVENOUS; SUBCUTANEOUS at 08:40

## 2022-10-31 RX ADMIN — Medication 81 MILLIGRAM(S): at 11:48

## 2022-10-31 RX ADMIN — Medication 40 MILLIGRAM(S): at 21:34

## 2022-10-31 RX ADMIN — ATORVASTATIN CALCIUM 80 MILLIGRAM(S): 80 TABLET, FILM COATED ORAL at 21:34

## 2022-10-31 RX ADMIN — HEPARIN SODIUM 2000 UNIT(S)/HR: 5000 INJECTION INTRAVENOUS; SUBCUTANEOUS at 20:20

## 2022-10-31 RX ADMIN — TICAGRELOR 90 MILLIGRAM(S): 90 TABLET ORAL at 20:03

## 2022-10-31 RX ADMIN — HEPARIN SODIUM 2000 UNIT(S)/HR: 5000 INJECTION INTRAVENOUS; SUBCUTANEOUS at 06:50

## 2022-10-31 RX ADMIN — HEPARIN SODIUM 2000 UNIT(S)/HR: 5000 INJECTION INTRAVENOUS; SUBCUTANEOUS at 08:38

## 2022-10-31 RX ADMIN — Medication 40 MILLIGRAM(S): at 11:48

## 2022-10-31 RX ADMIN — TICAGRELOR 90 MILLIGRAM(S): 90 TABLET ORAL at 05:48

## 2022-10-31 NOTE — PROGRESS NOTE ADULT - ASSESSMENT
Note not final until signed by attending       Yan Wells MD  Cardiology Fellow PGY-5  Phone: [phone]    For all New Consults  [email or web address]   Login: [facility] 62[facility] with PMH EtOH abuse (quitted 2 month ago, 6 pack beer daily prior to that), NYHA Class 2 HFrEF 2/2 EtOH NICM ([facility] 2021 with 20% ramus and 30% pRCA lesion) and pAF admitted with ADHF     #ADHF  Clinically, his presentation is less likely to be ischemic in etiology. Would not trend troponin further   Remains fluid overloaded, cont IV lasix 40 mg BID and monitor I/O, telemetry and electrolytes  GDMT: continue losartan 25mg daily and toprol XL 50mg daily  Strict I/O & Daily standing weight  ICD eval as outpatient on optimized GDMT    #Paroxysmal Afib  BB as above  AC given his CHADVASC >2   EP consulted for rhythm control strategy; possible DCCV       Note not final until signed by attending       Yan Wells MD  Cardiology Fellow PGY-5  Phone: [phone]    For all New Consults  [email or web address]   Login: [email or web address]

## 2022-10-31 NOTE — PROGRESS NOTE ADULT - PROBLEM SELECTOR PLAN 2
- Troponin 129--144 in setting of CHF exacerbation and rapid Afib.   - per cards Last LHC at [place] 9/2021 with 20% ramus lesion and 30% prox RCA lesion  - s/p brillinta and aspirin load at [facility]     - Continue tele monitoring  - Continue aspirin and Brillinta; statin 80mg daily Metoprolol   - c/w AC heparin gtt

## 2022-10-31 NOTE — CONSULT NOTE ADULT - NS ATTEND AMEND GEN_ALL_CORE FT
63yo M w/ longstanding ETOH abuse, nonobstructive CAD, HFrEF, long standing persistent atrial fibrillation, who  presented to [facility] with decompensated heart failure. Found to have elevated troponin  and transferred to [facility] ischemic evaluation for NSTEMI. Echo showed EF 10%. LA severely enlarged (5.0 cm). Took meds for one month last year but stopped. Now agreeable to medical therapy. Currently acceptable rate controlled. Will need ICD in 3 months after OMT. Discussed enrollment in JEWEL study, but he declined. Would continue with rate control for now. Will discuss rhythm control in the future, though given his LA size, unclear if we would maintain SR. He also would benefit from a cardiac MR once euvolemic to further evaluate his HF.
Personally saw and examined patient  labs and vitals reviewed  agree with above assessment and plan  pt with multiple medical issues, NICM EF 30, pAF, pt not on any meds at home  pt denies active or recent cp.  poor compliance with meds and follow up.   now with ADMF, cont diuresis.  suspect volume overload 2/2 afib with rvr  when further optimized from a volume perspective would have EP consult for possible DCCV and ICD eval  check tte now  cont a/c for chadsvasc at least 2-3  monitor on tele  low suspicion for acs here, CE not consistent with ACS  would not cont dapt  will follow with you

## 2022-10-31 NOTE — PROGRESS NOTE ADULT - SUBJECTIVE AND OBJECTIVE BOX
[facility] Division of Hospital Medicine  Tony Parker M.D  Pager [phone]  Available via MS Teams    SUBJECTIVE / OVERNIGHT EVENTS: Patient feels okay, endorse continue SOB. States he cannot lie flat on bed no matter the position. Feels better when sitting up in chair. Denies any chest pain, fevers or chills at this time.     ADDITIONAL REVIEW OF SYSTEMS:    MEDICATIONS  (STANDING):  aspirin enteric coated 81 milliGRAM(s) Oral daily  atorvastatin 80 milliGRAM(s) Oral at bedtime  furosemide   Injectable 40 milliGRAM(s) IV Push every 12 hours  heparin  Infusion. 1900 Unit(s)/Hr (19 mL/Hr) IV Continuous <Continuous>  influenza   Vaccine 0.5 milliLiter(s) IntraMuscular once  losartan 25 milliGRAM(s) Oral daily  metoprolol succinate ER 50 milliGRAM(s) Oral daily  ticagrelor 90 milliGRAM(s) Oral every 12 hours    MEDICATIONS  (PRN):  acetaminophen     Tablet .. 650 milliGRAM(s) Oral every 6 hours PRN Temp greater or equal to 38C (100.4F), Mild Pain (1 - 3)  heparin   Injectable 8000 Unit(s) IV Push every 6 hours PRN For aPTT less than 40  heparin   Injectable 4000 Unit(s) IV Push every 6 hours PRN For aPTT between 40 - 57  melatonin 3 milliGRAM(s) Oral at bedtime PRN Insomnia      I&O's Summary    30 Oct 2022 07:01  -  31 Oct 2022 07:00  --------------------------------------------------------  IN: 1137 mL / OUT: 1650 mL / NET: -513 mL    31 Oct 2022 07:01  -  31 Oct 2022 14:24  --------------------------------------------------------  IN: 0 mL / OUT: 800 mL / NET: -800 mL        PHYSICAL EXAM:  Vital Signs Last 24 Hrs  T(C): 36.9 (31 Oct 2022 14:11), Max: 36.9 (31 Oct 2022 05:38)  T(F): 98.4 (31 Oct 2022 14:11), Max: 98.5 (31 Oct 2022 05:38)  HR: 90 (31 Oct 2022 14:11) (61 - 101)  BP: 109/77 (31 Oct 2022 14:11) (91/70 - 109/77)  BP(mean): --  RR: 17 (31 Oct 2022 14:11) (17 - 20)  SpO2: 99% (31 Oct 2022 14:11) (98% - 99%)    Parameters below as of 31 Oct 2022 14:11  Patient On (Oxygen Delivery Method): room air      CONSTITUTIONAL: NAD, well-developed, sitting up in chair   NECK:  no JVD appreciated   RESPIRATORY: Normal respiratory effort; mild crackels noted   CARDIOVASCULAR: Irregualrly Irregualr rate and rhythm, normal S1 and S2, no murmur/rub/gallop; trace lower extremity edema  ABDOMEN: Nontender to palpation, normoactive bowel sounds, no rebound/guarding; No hepatosplenomegaly  MUSCULOSKELETAL:  no joint swelling or tenderness to palpation  PSYCH: A+O to person, place, and time; affect appropriate  NEUROLOGY:  no gross sensory deficits or focal deficits   SKIN: No rashes; no palpable lesions    LABS:                        13.6   7.55  )-----------( 277      ( 31 Oct 2022 06:48 )             39.8     10-31    133<L>  |  95<L>  |  19  ----------------------------<  101<H>  3.7   |  27  |  1.08    Ca    9.5      31 Oct 2022 06:48  Phos  4.2     10-31  Mg     2.00     10-31      PT/INR - ( 31 Oct 2022 06:48 )   PT: 13.6 sec;   INR: 1.17 ratio         PTT - ( 31 Oct 2022 06:48 )  PTT:87.9 sec

## 2022-10-31 NOTE — CONSULT NOTE ADULT - ASSESSMENT
61yo M w/ longstanding ETOH abuse, nonobstructive CAD, HFrEF, (noncompliant,  atrial fibrillation, diagnosed one year ago at [facility] who  presented to [facility] with decompensated heart failure. Found to have elevated troponin  and transferred to [facility] ischemic evaluation for NSTEMI.  Patient states had a cardiac angiogram one year ago at [facility] showing no blockages and an echo showing a weak heart. He was started on GDMT and anticoagulation and was presented with the idea of an ICD but did not follow-up with any doctors and did not continue the medications due to "being lazy".  During this admission he has been in atrial fibrillation w/RVR 's, Started on metoprolol succinate ER 50mg daily and being diuresed with IV Lasix with minimal subjective improvement.  Currently on heparin for anticoagulation.      TTE:  tethered mitral leaflets with mild MR, severely dilated left atrium and severe global LV systolic dysfunction with LVEF 10%. RV with normal systolic function.   TSH: 2.9  Abdominal Sono: Mild steatohepatitis.     Plan: Spoke with patient in detail about the importance of continuing GDMT and anticoagulation upon discharge. Discussed the possibility of an ICD if after three months of continued medication his LVEF does not improve to > 35%. We offered a life vest for 3 months as a bridge but he refused. States he quit drinking ETOH 3-4 weeks ago and plans to stay sober. In the meantime, he is being diuresed with IV Lasix but not yet feeling better. Still unable to lay flat. His AFib is fairly well rate controlled on Toprol XL 50mg daily. Can increase if SBP tolerates.   Will follow with you.

## 2022-10-31 NOTE — PROGRESS NOTE ADULT - PROBLEM SELECTOR PLAN 5
DVT ppx: already on heparin gtt  dispo: no cath today, patient cannot lay flat at this time, will continue with  GDMT

## 2022-10-31 NOTE — PROGRESS NOTE ADULT - SUBJECTIVE AND OBJECTIVE BOX
Patient seen and examined at bedside.    Overnight Events:   - Denies CP but reports SOB has not improved since admission  - Still urinating well       Current Meds:  acetaminophen     Tablet .. 650 milliGRAM(s) Oral every 6 hours PRN  aspirin enteric coated 81 milliGRAM(s) Oral daily  atorvastatin 80 milliGRAM(s) Oral at bedtime  furosemide   Injectable 40 milliGRAM(s) IV Push every 12 hours  heparin   Injectable 8000 Unit(s) IV Push every 6 hours PRN  heparin   Injectable 4000 Unit(s) IV Push every 6 hours PRN  heparin  Infusion. 1900 Unit(s)/Hr IV Continuous <Continuous>  influenza   Vaccine 0.5 milliLiter(s) IntraMuscular once  losartan 25 milliGRAM(s) Oral daily  melatonin 3 milliGRAM(s) Oral at bedtime PRN  metoprolol succinate ER 50 milliGRAM(s) Oral daily  ticagrelor 90 milliGRAM(s) Oral every 12 hours      PAST MEDICAL & SURGICAL HISTORY:  Chronic systolic congestive heart failure      CAD (coronary artery disease)      ETOH abuse      Atrial fibrillation      No significant past surgical history          Vitals:  T(F): 98.4 (10-31), Max: 98.5 (10-31)  HR: 90 (10-31) (61 - 101)  BP: 109/77 (10-31) (91/70 - 109/77)  RR: 17 (10-31)  SpO2: 99% (10-31)  I&O's Summary    30 Oct 2022 07:01  -  31 Oct 2022 07:00  --------------------------------------------------------  IN: 1137 mL / OUT: 1650 mL / NET: -513 mL    31 Oct 2022 07:01  -  31 Oct 2022 18:17  --------------------------------------------------------  IN: 0 mL / OUT: 800 mL / NET: -800 mL        Physical Exam:  Appearance: No acute distress  Eyes: PERRL, EOMI, pink conjunctiva  HENT: Normal oral mucosa  Cardiovascular: RRR, S1, S2, no murmurs, rubs, or gallops; +LE edema no JVD  Respiratory: Clear to auscultation bilaterally  Gastrointestinal: soft, non-tender, non-distended with normal bowel sounds  Musculoskeletal: No clubbing; no joint deformity   Neurologic: Non-focal  Lymphatic: No lymphadenopathy  Psychiatry: AAOx3, mood & affect appropriate  Skin: No rashes, ecchymoses, or cyanosis                          13.6   7.55  )-----------( 277      ( 31 Oct 2022 06:48 )             39.8     10-31    133<L>  |  95<L>  |  19  ----------------------------<  101<H>  3.7   |  27  |  1.08    Ca    9.5      31 Oct 2022 06:48  Phos  4.2     10-31  Mg     2.00     10-31      PT/INR - ( 31 Oct 2022 06:48 )   PT: 13.6 sec;   INR: 1.17 ratio         PTT - ( 31 Oct 2022 06:48 )  PTT:87.9 sec      Serum Pro-Brain Natriuretic Peptide: 4021 pg/mL (10-27 @ 19:37)          New ECG(s): Personally reviewed    Echo:    PROCEDURE: Transthoracic echocardiogram with 2-D, M-Mode  and complete spectral and color flow Doppler.  Intravenous ultrasound enhancing agent was administered for  improved left ventricular endocardial border definition.  Following the intravenous injection of ultrasound enhancing  agent, harmonic imaging was performed.  INDICATION: Unspecified combined systolic (congestive) and  diastolic (congestive) heart failure (I50.40)  ------------------------------------------------------------------------  DIMENSIONS:  Dimensions:     Normal Values:  LA:     5.0 cm    2.0 - 4.0 cm  Ao:     3.1 cm    2.0 - 3.8 cm  SEPTUM:0.8 cm    0.6 - 1.2 cm  PWT:    0.8 cm    0.6 - 1.1 cm  LVIDd:  7.3 cm    3.0 - 5.6 cm  LVIDs:    ---     1.8 - 4.0 cm  Derived Variables:  LVMI: 123 g/m2  RWT: 0.21  Ejection Fraction (Visual Estimate): 10 %  ------------------------------------------------------------------------  OBSERVATIONS:  Mitral Valve: Tethered mitral valve leaflets with normal  opening. Mild mitral regurgitation.  Aortic Root: Normal aortic root.  Aortic Valve: Calcified trileaflet aortic valve with normal  opening.  Left Atrium: Severely dilated left atrium.  LA volume index  = 50 cc/m2.  Left Ventricle: Severe global left ventricular systolic  dysfunction. Moderate left ventricular enlargement.  Right Heart: Normal right atrium. Right ventricular  enlargement withnormal right ventricular systolic  function. Normal tricuspid valve. Normal pulmonic valve.  Pericardium/PleuraNormal pericardium with no pericardial  effusion.  ------------------------------------------------------------------------  CONCLUSIONS:  1. Tethered mitral valve leaflets with normal opening. Mild  mitral regurgitation.  2. Calcified trileaflet aortic valve with normal opening.  3. Severely dilated left atrium.  LA volume index = 50  cc/m2.  4. Moderate left ventricular enlargement.  5. Severe global left ventricular systolic dysfunction.  6. Right ventricular enlargement with normal right  ventricular systolic function.

## 2022-10-31 NOTE — CONSULT NOTE ADULT - SUBJECTIVE AND OBJECTIVE BOX
61yo M w/ longstanding ETOH abuse,  nonobstructive CAD, HFrEF, (noncompliant,  atrial fibrillation, diagnosed one year ago at [facility] who  presented to [facility] with progressive shortness of breath with inability to lay flat and lower extremity edema. Found to have elevated troponin  and transferred to [facility] ischemic evaluation for NSTEMI.  Patient states had a cardiac angiogram one year ago at [facility] showing no blockages and an echo showing a weak heart. He was started on GDMT and anticoagulation and was presented with the idea of an ICD but did not follow-up with any doctors and did not continue the medications due to "being lazy".  Denies chest pain, palpitations, dizziness or syncope. During this admission he has been in atrial fibrillation w/RVR 's, Started on metoprolol succinate ER 50mg daily. Currently on heparin for anticoagulation.        Vital Signs Last 24 Hrs  T(C): 36.9 (31 Oct 2022 14:11), Max: 36.9 (31 Oct 2022 05:38)  T(F): 98.4 (31 Oct 2022 14:11), Max: 98.5 (31 Oct 2022 05:38)  HR: 90 (31 Oct 2022 14:11) (61 - 101)  BP: 109/77 (31 Oct 2022 14:11) (91/70 - 109/77)  BP(mean): --  RR: 17 (31 Oct 2022 14:11) (17 - 20)  SpO2: 99% (31 Oct 2022 14:11) (98% - 99%)    Parameters below as of 31 Oct 2022 14:11  Patient On (Oxygen Delivery Method): room air      EKG: Atrial fibrillation 104 bpm with PVC's. QRSd 108ms  Q waves anteroseptal leads. LAFB.  Telemetry: Atrial fibrillation  bpm Multifocal PVC's. No NSVT.     No Known Allergies  MEDICATIONS  (STANDING):    aspirin enteric coated 81 milliGRAM(s) Oral daily  atorvastatin 80 milliGRAM(s) Oral at bedtime  furosemide   Injectable 40 milliGRAM(s) IV Push every 12 hours  heparin  Infusion. 1900 Unit(s)/Hr (19 mL/Hr) IV Continuous <Continuous>  influenza   Vaccine 0.5 milliLiter(s) IntraMuscular once  losartan 25 milliGRAM(s) Oral daily  metoprolol succinate ER 50 milliGRAM(s) Oral daily  ticagrelor 90 milliGRAM(s) Oral every 12 hours    MEDICATIONS  (PRN):  acetaminophen     Tablet .. 650 milliGRAM(s) Oral every 6 hours PRN Temp greater or equal to 38C (100.4F), Mild Pain (1 - 3)  heparin   Injectable 8000 Unit(s) IV Push every 6 hours PRN For aPTT less than 40  heparin   Injectable 4000 Unit(s) IV Push every 6 hours PRN For aPTT between 40 - 57  melatonin 3 milliGRAM(s) Oral at bedtime PRN Insomnia          Physical exam:   Gen- well developed well nourished NAD sitting up in chair at bedside  Resp- decreased breath sounds throughout. No wheezing, rales or rhonchi  CV- S1 and S2 irregular irregular. No murmurs, gallops or rubs appreciated.   ABD- obese nontender. Mildly distended.   EXT-trace edema lower legs bilaterally  Neuro- grossly nonfocal                            13.6   7.55  )-----------( 277      ( 31 Oct 2022 06:48 )             39.8     PT/INR - ( 31 Oct 2022 06:48 )   PT: 13.6 sec;   INR: 1.17 ratio         PTT - ( 31 Oct 2022 06:48 )  PTT:87.9 sec  10-31    133<L>  |  95<L>  |  19  ----------------------------<  101<H>  3.7   |  27  |  1.08    Ca    9.5      31 Oct 2022 06:48  Phos  4.2     10-31  Mg     2.00     10-31        < from: TTE with Doppler (w/Cont) (10.28.22 @ 12:44) >  Patient name: SRI ECHEVERRIA  YOB: 1960   Age: 62 (M)   MR#: [id number]  Study Date: 10/28/2022  [facility] JANAY Jalloh  Study quality: Technically good  Referring Physician: Liya Carter MD  Blood Pressure: 118/87 mmHg  Height: 175 cm  Weight: 99 kg  BSA: 2.1 m2  ------------------------------------------------------------------------  PROCEDURE: Transthoracic echocardiogram with 2-D, M-Mode  and complete spectral and color flow Doppler.  Intravenous ultrasound enhancing agent was administered for  improved left ventricular endocardial border definition.  Following the intravenous injection of ultrasound enhancing  agent, harmonic imaging was performed.  INDICATION: Unspecified combined systolic (congestive) and  diastolic (congestive) heart failure (I50.40)  ------------------------------------------------------------------------  DIMENSIONS:  Dimensions:     Normal Values:  LA:     5.0 cm    2.0 - 4.0 cm  Ao:     3.1 cm    2.0 - 3.8 cm  SEPTUM:0.8 cm    0.6 - 1.2 cm  PWT:    0.8 cm    0.6 - 1.1 cm  LVIDd:  7.3 cm    3.0 - 5.6 cm  LVIDs:    ---     1.8 - 4.0 cm  Derived Variables:  LVMI: 123 g/m2  RWT: 0.21  Ejection Fraction (Visual Estimate): 10 %  ------------------------------------------------------------------------  OBSERVATIONS:  Mitral Valve: Tethered mitral valve leaflets with normal  opening. Mild mitral regurgitation.  Aortic Root: Normal aortic root.  Aortic Valve: Calcified trileaflet aortic valve with normal  opening.  Left Atrium: Severely dilated left atrium.  LA volume index  = 50 cc/m2.  Left Ventricle: Severe global left ventricular systolic  dysfunction. Moderate left ventricular enlargement.  Right Heart: Normal right atrium. Right ventricular  enlargement withnormal right ventricular systolic  function. Normal tricuspid valve. Normal pulmonic valve.  Pericardium/PleuraNormal pericardium with no pericardial  effusion.  ------------------------------------------------------------------------  CONCLUSIONS:  1. Tethered mitral valve leaflets with normal opening. Mild  mitral regurgitation.  2. Calcified trileaflet aortic valve with normal opening.  3. Severely dilated left atrium.  LA volume index = 50  cc/m2.  4. Moderate left ventricular enlargement.  5. Severe global left ventricular systolic dysfunction.  6. Right ventricular enlargement with normal right  ventricular systolic function.  ------------------------------------------------------------------------  Confirmed on  10/28/2022 - 15:00:09 by JOSÉ Ferrari  ------------------------------------------------------------------------          < from: US Abdomen Upper Quadrant Right (10.28.22 @ 07:57) >  ACC: [id number] EXAM:  US ABDOMEN RT UPR QUADRANT                          PROCEDURE DATE:  10/28/2022    INTERPRETATION:  CLINICAL INFORMATION: Elevated LFTs.    COMPARISON: None available.  TECHNIQUE: Sonography of the right upper quadrant.    FINDINGS:  Liver: Increased echogenicity. Either a small cyst or focus of fatty   sparing.  Bile ducts: Normal caliber. Common bile duct measures 4 mm.  Gallbladder: Within normal limits.  Pancreas: Visualized portions are within normal limits.  Right kidney: 10.9 cm. No hydronephrosis.  Ascites: None.  IVC: Visualized portions are within normal limits.    IMPRESSION:  Mild hepatic steatosis.    LAURA SIDDIQI MD; Attending Radiologist  This document has been electronically signed. Oct 28 2022 11:32AM

## 2022-10-31 NOTE — PROGRESS NOTE ADULT - ASSESSMENT
62M with PMHx nonobstructive CAD, NICM (EF <30% 9/2021), ETOH abuse, Afib presenting with SOB and LE edema x5 days. Admitted for acute CHF exacerbation and likely type 2 demand ischemia/NSTEMI.     Transferred from [facility] for ?NSTEMI and need for cath.

## 2022-10-31 NOTE — PROGRESS NOTE ADULT - PROBLEM SELECTOR PLAN 1
-on presentation with LE edema, mild crackles, elevated BNP, +SOB, orthopnea, now improved   - hx of HFrEF <30% EF 9/2021. Pt is noncompliant with meds for the last year.   - Afib with RVR likely causing CHF exacerbation, TTE with EF 10% with severely dialted LA, moderate LV enlargement.   -CHF likely tachycardia induced vs ETOH induced CM given dilated LA, but will need ischemic evaluation; however patient unable to lay flat at this time  - Continue IV Lasix 40mg BID, total UOP 1650 total   - Cards following, appreciate recs, no plan for cath as patient cannot lay flat, will continue heparin gtt for now and transition   - outpatient followup for ICD once on GDMT   - Strict I/O, NEEDS daily weight. Monitor UOP  - c/w losartan 25mg and metop 50mg, Entresto??   - Keep K>4 and Mg>2  continue tele monitoring

## 2022-11-01 LAB
ANION GAP SERPL CALC-SCNC: 15 MMOL/L — HIGH (ref 7–14)
APTT BLD: 106.3 SEC — SIGNIFICANT CHANGE UP (ref 27–36.3)
APTT BLD: 89.4 SEC — HIGH (ref 27–36.3)
BUN SERPL-MCNC: 18 MG/DL — SIGNIFICANT CHANGE UP (ref 7–23)
CALCIUM SERPL-MCNC: 9.7 MG/DL — SIGNIFICANT CHANGE UP (ref 8.4–10.5)
CHLORIDE SERPL-SCNC: 91 MMOL/L — LOW (ref 98–107)
CO2 SERPL-SCNC: 27 MMOL/L — SIGNIFICANT CHANGE UP (ref 22–31)
CREAT SERPL-MCNC: 1.18 MG/DL — SIGNIFICANT CHANGE UP (ref 0.5–1.3)
EGFR: 69 ML/MIN/1.73M2 — SIGNIFICANT CHANGE UP
GLUCOSE SERPL-MCNC: 103 MG/DL — HIGH (ref 70–99)
HCT VFR BLD CALC: 44.8 % — SIGNIFICANT CHANGE UP (ref 39–50)
HGB BLD-MCNC: 14.9 G/DL — SIGNIFICANT CHANGE UP (ref 13–17)
MAGNESIUM SERPL-MCNC: 2.1 MG/DL — SIGNIFICANT CHANGE UP (ref 1.6–2.6)
MCHC RBC-ENTMCNC: 29.3 PG — SIGNIFICANT CHANGE UP (ref 27–34)
MCHC RBC-ENTMCNC: 33.3 GM/DL — SIGNIFICANT CHANGE UP (ref 32–36)
MCV RBC AUTO: 88 FL — SIGNIFICANT CHANGE UP (ref 80–100)
NRBC # BLD: 0 /100 WBCS — SIGNIFICANT CHANGE UP (ref 0–0)
NRBC # FLD: 0 K/UL — SIGNIFICANT CHANGE UP (ref 0–0)
PHOSPHATE SERPL-MCNC: 4.2 MG/DL — SIGNIFICANT CHANGE UP (ref 2.5–4.5)
PLATELET # BLD AUTO: 265 K/UL — SIGNIFICANT CHANGE UP (ref 150–400)
POTASSIUM SERPL-MCNC: 3.6 MMOL/L — SIGNIFICANT CHANGE UP (ref 3.5–5.3)
POTASSIUM SERPL-SCNC: 3.6 MMOL/L — SIGNIFICANT CHANGE UP (ref 3.5–5.3)
RBC # BLD: 5.09 M/UL — SIGNIFICANT CHANGE UP (ref 4.2–5.8)
RBC # FLD: 14.1 % — SIGNIFICANT CHANGE UP (ref 10.3–14.5)
SODIUM SERPL-SCNC: 133 MMOL/L — LOW (ref 135–145)
WBC # BLD: 7.97 K/UL — SIGNIFICANT CHANGE UP (ref 3.8–10.5)
WBC # FLD AUTO: 7.97 K/UL — SIGNIFICANT CHANGE UP (ref 3.8–10.5)

## 2022-11-01 PROCEDURE — 99232 SBSQ HOSP IP/OBS MODERATE 35: CPT

## 2022-11-01 PROCEDURE — 93970 EXTREMITY STUDY: CPT | Mod: 26

## 2022-11-01 PROCEDURE — 99233 SBSQ HOSP IP/OBS HIGH 50: CPT

## 2022-11-01 PROCEDURE — 99232 SBSQ HOSP IP/OBS MODERATE 35: CPT | Mod: GC

## 2022-11-01 RX ORDER — FUROSEMIDE 40 MG
40 TABLET ORAL EVERY 12 HOURS
Refills: 0 | Status: DISCONTINUED | OUTPATIENT
Start: 2022-11-01 | End: 2022-11-02

## 2022-11-01 RX ORDER — APIXABAN 2.5 MG/1
5 TABLET, FILM COATED ORAL EVERY 12 HOURS
Refills: 0 | Status: DISCONTINUED | OUTPATIENT
Start: 2022-11-01 | End: 2022-11-02

## 2022-11-01 RX ADMIN — APIXABAN 5 MILLIGRAM(S): 2.5 TABLET, FILM COATED ORAL at 14:38

## 2022-11-01 RX ADMIN — LOSARTAN POTASSIUM 25 MILLIGRAM(S): 100 TABLET, FILM COATED ORAL at 07:05

## 2022-11-01 RX ADMIN — HEPARIN SODIUM 1800 UNIT(S)/HR: 5000 INJECTION INTRAVENOUS; SUBCUTANEOUS at 07:15

## 2022-11-01 RX ADMIN — Medication 81 MILLIGRAM(S): at 12:04

## 2022-11-01 RX ADMIN — ATORVASTATIN CALCIUM 80 MILLIGRAM(S): 80 TABLET, FILM COATED ORAL at 21:44

## 2022-11-01 RX ADMIN — Medication 40 MILLIGRAM(S): at 18:13

## 2022-11-01 RX ADMIN — HEPARIN SODIUM 1800 UNIT(S)/HR: 5000 INJECTION INTRAVENOUS; SUBCUTANEOUS at 07:59

## 2022-11-01 RX ADMIN — TICAGRELOR 90 MILLIGRAM(S): 90 TABLET ORAL at 07:05

## 2022-11-01 RX ADMIN — Medication 50 MILLIGRAM(S): at 07:05

## 2022-11-01 NOTE — PROGRESS NOTE ADULT - SUBJECTIVE AND OBJECTIVE BOX
[facility] Division of [facility] Medicine  Tony Hamiltonmadysunil M.D  Pager [phone]  Available via MS Teams    SUBJECTIVE / OVERNIGHT EVENTS: No acute events overnight. Patient feels well overall. Able to lay on his side for up to 40 mins last night before feeling SOB.     ADDITIONAL REVIEW OF SYSTEMS:    MEDICATIONS  (STANDING):  apixaban 5 milliGRAM(s) Oral every 12 hours  aspirin enteric coated 81 milliGRAM(s) Oral daily  atorvastatin 80 milliGRAM(s) Oral at bedtime  furosemide   Injectable 40 milliGRAM(s) IV Push every 12 hours  influenza   Vaccine 0.5 milliLiter(s) IntraMuscular once  losartan 25 milliGRAM(s) Oral daily  metoprolol succinate ER 50 milliGRAM(s) Oral daily    MEDICATIONS  (PRN):  acetaminophen     Tablet .. 650 milliGRAM(s) Oral every 6 hours PRN Temp greater or equal to 38C (100.4F), Mild Pain (1 - 3)  melatonin 3 milliGRAM(s) Oral at bedtime PRN Insomnia      I&O's Summary    31 Oct 2022 07:01  -  01 Nov 2022 07:00  --------------------------------------------------------  IN: 0 mL / OUT: 2150 mL / NET: -2150 mL    01 Nov 2022 07:01  -  01 Nov 2022 13:31  --------------------------------------------------------  IN: 0 mL / OUT: 250 mL / NET: -250 mL        PHYSICAL EXAM:  Vital Signs Last 24 Hrs  T(C): 36.6 (01 Nov 2022 10:58), Max: 36.9 (31 Oct 2022 14:11)  T(F): 97.9 (01 Nov 2022 10:58), Max: 98.4 (31 Oct 2022 14:11)  HR: 78 (01 Nov 2022 12:05) (60 - 90)  BP: 96/60 (01 Nov 2022 12:05) (90/65 - 109/77)  BP(mean): --  RR: 19 (01 Nov 2022 10:58) (17 - 20)  SpO2: 95% (01 Nov 2022 10:58) (95% - 99%)    Parameters below as of 01 Nov 2022 10:58  Patient On (Oxygen Delivery Method): room air      CONSTITUTIONAL: NAD, well-developed, well-groomed  RESPIRATORY: Normal respiratory effort; lungs are clear to auscultation bilaterally  CARDIOVASCULAR: Regular rate and rhythm, normal S1 and S2, no murmur/rub/gallop; No lower extremity edema; Peripheral pulses are 2+ bilaterally  ABDOMEN: Nontender to palpation, normoactive bowel sounds, no rebound/guarding; No hepatosplenomegaly  MUSCULOSKELETAL:  Normal gait; no clubbing or cyanosis of digits; no joint swelling or tenderness to palpation  PSYCH: A+O to person, place, and time; affect appropriate  NEUROLOGY: CN 2-12 are intact and symmetric; no gross sensory deficits   SKIN: No rashes; no palpable lesions    LABS:                        14.9   7.97  )-----------( 265      ( 01 Nov 2022 04:30 )             44.8     11-01    133<L>  |  91<L>  |  18  ----------------------------<  103<H>  3.6   |  27  |  1.18    Ca    9.7      01 Nov 2022 04:30  Phos  4.2     11-01  Mg     2.10     11-01      PT/INR - ( 31 Oct 2022 06:48 )   PT: 13.6 sec;   INR: 1.17 ratio         PTT - ( 01 Nov 2022 04:30 )  PTT:106.3 sec

## 2022-11-01 NOTE — PROGRESS NOTE ADULT - SUBJECTIVE AND OBJECTIVE BOX
Interval history:  no acute overnight event  pt. reports he feels better and his breathing is less labored  Tele reveals Afib      PAST MEDICAL & SURGICAL HISTORY:  Chronic systolic congestive heart failure    CAD (coronary artery disease)    ETOH abuse    Atrial fibrillation    No significant past surgical history        MEDICATIONS  (STANDING):  aspirin enteric coated 81 milliGRAM(s) Oral daily  atorvastatin 80 milliGRAM(s) Oral at bedtime  furosemide   Injectable 40 milliGRAM(s) IV Push every 12 hours  heparin  Infusion. 1900 Unit(s)/Hr (19 mL/Hr) IV Continuous <Continuous>  influenza   Vaccine 0.5 milliLiter(s) IntraMuscular once  losartan 25 milliGRAM(s) Oral daily  metoprolol succinate ER 50 milliGRAM(s) Oral daily  ticagrelor 90 milliGRAM(s) Oral every 12 hours    MEDICATIONS  (PRN):  acetaminophen     Tablet .. 650 milliGRAM(s) Oral every 6 hours PRN Temp greater or equal to 38C (100.4F), Mild Pain (1 - 3)  heparin   Injectable 8000 Unit(s) IV Push every 6 hours PRN For aPTT less than 40  heparin   Injectable 4000 Unit(s) IV Push every 6 hours PRN For aPTT between 40 - 57  melatonin 3 milliGRAM(s) Oral at bedtime PRN Insomnia            Vital Signs Last 24 Hrs  T(C): 36.5 (01 Nov 2022 07:00), Max: 36.9 (31 Oct 2022 14:11)  T(F): 97.7 (01 Nov 2022 07:00), Max: 98.4 (31 Oct 2022 14:11)  HR: 67 (01 Nov 2022 07:00) (60 - 90)  BP: 102/70 (01 Nov 2022 07:00) (102/70 - 109/77)  BP(mean): --  RR: 20 (01 Nov 2022 07:00) (17 - 20)  SpO2: 98% (01 Nov 2022 07:00) (98% - 99%)    Parameters below as of 01 Nov 2022 07:00  Patient On (Oxygen Delivery Method): room air                INTERPRETATION OF TELEMETRY: Afib at 100s-110s    ECG:        LABS:                        14.9   7.97  )-----------( 265      ( 01 Nov 2022 04:30 )             44.8     11-01    133<L>  |  91<L>  |  18  ----------------------------<  103<H>  3.6   |  27  |  1.18    Ca    9.7      01 Nov 2022 04:30  Phos  4.2     11-01  Mg     2.10     11-01          PT/INR - ( 31 Oct 2022 06:48 )   PT: 13.6 sec;   INR: 1.17 ratio         PTT - ( 01 Nov 2022 04:30 )  PTT:106.3 sec    I&O's Summary    31 Oct 2022 07:01  -  01 Nov 2022 07:00  --------------------------------------------------------  IN: 0 mL / OUT: 2150 mL / NET: -2150 mL      BNP  RADIOLOGY & ADDITIONAL STUDIES:      PHYSICAL EXAM:    GENERAL: In no apparent distress, well nourished, and hydrated.  HEART: Irregular rate and rhythm; No murmurs, rubs, or gallops.  PULMONARY: Clear to auscultation and percussion. no labored breathing  ABDOMEN: Soft, Nontender, Nondistended; Bowel sounds present  EXTREMITIES:  2+ Peripheral Pulses, No clubbing, cyanosis, + RLE 1+edema with mild erythema  NEUROLOGICAL: Grossly nonfocal

## 2022-11-01 NOTE — PROGRESS NOTE ADULT - NS ATTEND AMEND GEN_ALL_CORE FT
63yo M w/ longstanding ETOH abuse, nonobstructive CAD, HFrEF, long standing persistent atrial fibrillation, who  presented to [facility] with decompensated heart failure. Found to have elevated troponin  and transferred to [facility] ischemic evaluation for NSTEMI. Echo showed EF 10%. LA severely enlarged (5.0 cm). Took meds for one month last year but stopped. Now agreeable to medical therapy. Started on BB and overall rates acceptable. Will need ICD in 3 months after OMT. Discussed enrollment in JEWEL study, but he declined. Would continue with rate control for now. Will discuss rhythm control in the future, though given his LA size, unclear if we would maintain SR. He also would benefit from a cardiac MR once euvolemic to further evaluate his HF.

## 2022-11-01 NOTE — PHYSICAL THERAPY INITIAL EVALUATION ADULT - PERTINENT HX OF CURRENT PROBLEM, REHAB EVAL
Patient is 62 year old M with PMHx nonobstructive CAD, NICM (EF <30% 9/2021), ETOH abuse, Afib presenting with SOB and LE edema x5 days

## 2022-11-01 NOTE — PROGRESS NOTE ADULT - PROBLEM SELECTOR PLAN 2
- Troponin 129--144 in setting of CHF exacerbation and rapid Afib.   - per cards Last LHC at [facility] 9/2021 with 20% ramus lesion and 30% prox RCA lesion  - s/p brillinta and aspirin load at [facility], trop appears non ischemic per cards, will d/c brillinta   - Continue tele monitoring  - Continue aspirin statin, Metoprolol   - transition from heparin gtt to PO eliquis

## 2022-11-01 NOTE — PROGRESS NOTE ADULT - PROBLEM SELECTOR PLAN 5
DVT ppx: transition to eliquis   Dispo: no plans for cath during this inpatient, patient cannot lay flat at this time, will maximize GDMT

## 2022-11-01 NOTE — PROGRESS NOTE ADULT - ASSESSMENT
62M with PMHx nonobstructive CAD, NICM (EF <30% 9/2021), ETOH abuse, Afib presenting with SOB and LE edema x5 days. Admitted for acute CHF exacerbation and likely type 2 demand ischemia/NSTEMI. Was transffered from [facility] for poaaoble cath. Seen by cards, no plans for cath and non ischemic pattern. Plan for GDMT with outpatient followup

## 2022-11-01 NOTE — PROGRESS NOTE ADULT - PROBLEM SELECTOR PLAN 1
-on presentation with LE edema, mild crackles, elevated BNP, +SOB, orthopnea, now improved   - hx of HFrEF <30% EF 9/2021. Pt is noncompliant with meds for the last year, patient states due to lazziness   - Afib with RVR likely causing CHF exacerbation, TTE with EF 10% with severely dialted LA, moderate LV enlargement.   - CHF likely tachycardia induced vs ETOH induced CM given dilated LA, but will need ischemic evaluation; however patient unable to lay flat at this time, per cards no plans for cath ath this time, had LHC last year  - will plan to maximize with GDMT (losartan. metoprolol)   - on IV Lasix 40mg BID, plan to transition to PO tomorrow AM   - no plan for LHC or other intervention, will d/c hep gtt and restart on eliquis   - outpatient followup for ICD once on GDMT for 3 months   - Strict I/O, NEEDS daily weight. Monitor UOP

## 2022-11-01 NOTE — PROGRESS NOTE ADULT - ASSESSMENT
61yo M w/ longstanding ETOH abuse, nonobstructive CAD, HFrEF, (noncompliant,  atrial fibrillation, diagnosed one year ago at [facility] who  presented to [facility] with decompensated heart failure. Found to have elevated troponin  and transferred to [facility] ischemic evaluation for NSTEMI.  Patient states had a cardiac angiogram one year ago at [facility] showing no blockages and an echo showing a weak heart. He was started on GDMT and anticoagulation and was presented with the idea of an ICD but did not follow-up with any doctors and did not continue the medications due to "being lazy".  During this admission he has been in atrial fibrillation w/RVR 's, Started on metoprolol succinate ER 50mg daily and being diuresed with IV Lasix with minimal subjective improvement.  Currently on heparin for anticoagulation. Spoke with patient in detail about the importance of continuing GDMT and anticoagulation upon discharge. Discussed the possibility of an ICD if after three months of continued medication his LVEF does not improve to > 35%. We offered a life vest/Jewel study for 3 months as a bridge but he refused.       TTE:  tethered mitral leaflets with mild MR, severely dilated left atrium and severe global LV systolic dysfunction with LVEF 10%. RV with normal systolic function.   Abdominal Sono: Mild steatohepatitis.     Plan:  --Continue AC on Hep gtt for thromboembolic ppx   --pt. appears fluid overload, unable to lay flat, continue Lasix  -- Continue BB for rate control and rhythm control in the future    -- Continue OMT   -- Continue telemetry monitoring, overnight Afib at 100s-110s  -- Continue care as per primary team

## 2022-11-01 NOTE — PROGRESS NOTE ADULT - SUBJECTIVE AND OBJECTIVE BOX
Patient seen and examined at bedside.    Overnight Events:     REVIEW OF SYSTEMS:  General: no fatigue/malaise, weight loss/gain.  Skin: no rashes.  Ophthalmologic: no blurred vision, no loss of vision. 	  ENT: no sore throat, rhinorrhea, sinus congestion.  Respiratory: no SOB, cough or wheeze.  CV: No chest pain. No palpitations.   Gastrointestinal:  no N/V/D, no melena/hematemesis/hematochezia.  Genitourinary: no dysuria/hesitancy or hematuria.  Musculoskeletal: no myalgias or arthralgias.  Neurological: no changes in vision or hearing, no lightheadedness/dizziness, no syncope/near syncope	  Psychiatric: no unusual stress/anxiety.   Hematology/Lymphatics: no unusual bleeding, bruising and no lymphadenopathy.  Endocrine: no unusual thirst.        Current Meds:  acetaminophen     Tablet .. 650 milliGRAM(s) Oral every 6 hours PRN  aspirin enteric coated 81 milliGRAM(s) Oral daily  atorvastatin 80 milliGRAM(s) Oral at bedtime  furosemide   Injectable 40 milliGRAM(s) IV Push every 12 hours  heparin   Injectable 8000 Unit(s) IV Push every 6 hours PRN  heparin   Injectable 4000 Unit(s) IV Push every 6 hours PRN  heparin  Infusion. 1900 Unit(s)/Hr IV Continuous <Continuous>  influenza   Vaccine 0.5 milliLiter(s) IntraMuscular once  losartan 25 milliGRAM(s) Oral daily  melatonin 3 milliGRAM(s) Oral at bedtime PRN  metoprolol succinate ER 50 milliGRAM(s) Oral daily  ticagrelor 90 milliGRAM(s) Oral every 12 hours      PAST MEDICAL & SURGICAL HISTORY:  Chronic systolic congestive heart failure      CAD (coronary artery disease)      ETOH abuse      Atrial fibrillation      No significant past surgical history          Vitals:  T(F): 97.7 (11-01), Max: 98.4 (10-31)  HR: 67 (11-01) (60 - 90)  BP: 102/70 (11-01) (102/70 - 109/77)  RR: 20 (11-01)  SpO2: 98% (11-01)  I&O's Summary    31 Oct 2022 07:01  -  01 Nov 2022 07:00  --------------------------------------------------------  IN: 0 mL / OUT: 2150 mL / NET: -2150 mL        Physical Exam:  Appearance: No acute distress  Eyes: PERRL, EOMI, pink conjunctiva  HENT: Normal oral mucosa  Cardiovascular: RRR, S1, S2, no murmurs, rubs, or gallops; no edema; no JVD  Respiratory: Clear to auscultation bilaterally  Gastrointestinal: soft, non-tender, non-distended with normal bowel sounds  Musculoskeletal: No clubbing; no joint deformity   Neurologic: Non-focal  Lymphatic: No lymphadenopathy  Psychiatry: AAOx3, mood & affect appropriate  Skin: No rashes, ecchymoses, or cyanosis                          14.9   7.97  )-----------( 265      ( 01 Nov 2022 04:30 )             44.8     11-01    133<L>  |  91<L>  |  18  ----------------------------<  103<H>  3.6   |  27  |  1.18    Ca    9.7      01 Nov 2022 04:30  Phos  4.2     11-01  Mg     2.10     11-01      PT/INR - ( 31 Oct 2022 06:48 )   PT: 13.6 sec;   INR: 1.17 ratio         PTT - ( 01 Nov 2022 04:30 )  PTT:106.3 sec      Serum Pro-Brain Natriuretic Peptide: 4021 pg/mL (10-27 @ 19:37)          New ECG(s): Personally reviewed    Echo:    PROCEDURE: Transthoracic echocardiogram with 2-D, M-Mode  and complete spectral and color flow Doppler.  Intravenous ultrasound enhancing agent was administered for  improved left ventricular endocardial border definition.  Following the intravenous injection of ultrasound enhancing  agent, harmonic imaging was performed.  INDICATION: Unspecified combined systolic (congestive) and  diastolic (congestive) heart failure (I50.40)  ------------------------------------------------------------------------  DIMENSIONS:  Dimensions:     Normal Values:  LA:     5.0 cm    2.0 - 4.0 cm  Ao:     3.1 cm    2.0 - 3.8 cm  SEPTUM:0.8 cm    0.6 - 1.2 cm  PWT:    0.8 cm    0.6 - 1.1 cm  LVIDd:  7.3 cm    3.0 - 5.6 cm  LVIDs:    ---     1.8 - 4.0 cm  Derived Variables:  LVMI: 123 g/m2  RWT: 0.21  Ejection Fraction (Visual Estimate): 10 %  ------------------------------------------------------------------------  OBSERVATIONS:  Mitral Valve: Tethered mitral valve leaflets with normal  opening. Mild mitral regurgitation.  Aortic Root: Normal aortic root.  Aortic Valve: Calcified trileaflet aortic valve with normal  opening.  Left Atrium: Severely dilated left atrium.  LA volume index  = 50 cc/m2.  Left Ventricle: Severe global left ventricular systolic  dysfunction. Moderate left ventricular enlargement.  Right Heart: Normal right atrium. Right ventricular  enlargement withnormal right ventricular systolic  function. Normal tricuspid valve. Normal pulmonic valve.  Pericardium/PleuraNormal pericardium with no pericardial  effusion.  ------------------------------------------------------------------------  CONCLUSIONS:  1. Tethered mitral valve leaflets with normal opening. Mild  mitral regurgitation.  2. Calcified trileaflet aortic valve with normal opening.  3. Severely dilated left atrium.  LA volume index = 50  cc/m2.  4. Moderate left ventricular enlargement.  5. Severe global left ventricular systolic dysfunction.  6. Right ventricular enlargement with normal right  ventricular systolic function.   Patient seen and examined at bedside.    Overnight Events:   - On tele Afib 90s to low 100s  - Reports improvement in SOB, no CP       Current Meds:  acetaminophen     Tablet .. 650 milliGRAM(s) Oral every 6 hours PRN  aspirin enteric coated 81 milliGRAM(s) Oral daily  atorvastatin 80 milliGRAM(s) Oral at bedtime  furosemide   Injectable 40 milliGRAM(s) IV Push every 12 hours  heparin   Injectable 8000 Unit(s) IV Push every 6 hours PRN  heparin   Injectable 4000 Unit(s) IV Push every 6 hours PRN  heparin  Infusion. 1900 Unit(s)/Hr IV Continuous <Continuous>  influenza   Vaccine 0.5 milliLiter(s) IntraMuscular once  losartan 25 milliGRAM(s) Oral daily  melatonin 3 milliGRAM(s) Oral at bedtime PRN  metoprolol succinate ER 50 milliGRAM(s) Oral daily  ticagrelor 90 milliGRAM(s) Oral every 12 hours      PAST MEDICAL & SURGICAL HISTORY:  Chronic systolic congestive heart failure      CAD (coronary artery disease)      ETOH abuse      Atrial fibrillation      No significant past surgical history          Vitals:  T(F): 97.7 (11-01), Max: 98.4 (10-31)  HR: 67 (11-01) (60 - 90)  BP: 102/70 (11-01) (102/70 - 109/77)  RR: 20 (11-01)  SpO2: 98% (11-01)  I&O's Summary    31 Oct 2022 07:01  -  01 Nov 2022 07:00  --------------------------------------------------------  IN: 0 mL / OUT: 2150 mL / NET: -2150 mL        Physical Exam:  Appearance: No acute distress  Eyes: PERRL, EOMI, pink conjunctiva  HENT: Normal oral mucosa  Cardiovascular: RRR, S1, S2, no murmurs, rubs, or gallops; +trace LE edema; no JVD  Respiratory: Clear to auscultation bilaterally  Gastrointestinal: soft, non-tender, non-distended with normal bowel sounds  Musculoskeletal: No clubbing; no joint deformity   Neurologic: Non-focal  Lymphatic: No lymphadenopathy  Psychiatry: AAOx3, mood & affect appropriate  Skin: No rashes, ecchymoses, or cyanosis                          14.9   7.97  )-----------( 265      ( 01 Nov 2022 04:30 )             44.8     11-01    133<L>  |  91<L>  |  18  ----------------------------<  103<H>  3.6   |  27  |  1.18    Ca    9.7      01 Nov 2022 04:30  Phos  4.2     11-01  Mg     2.10     11-01      PT/INR - ( 31 Oct 2022 06:48 )   PT: 13.6 sec;   INR: 1.17 ratio         PTT - ( 01 Nov 2022 04:30 )  PTT:106.3 sec      Serum Pro-Brain Natriuretic Peptide: 4021 pg/mL (10-27 @ 19:37)          New ECG(s): Personally reviewed    Echo:    PROCEDURE: Transthoracic echocardiogram with 2-D, M-Mode  and complete spectral and color flow Doppler.  Intravenous ultrasound enhancing agent was administered for  improved left ventricular endocardial border definition.  Following the intravenous injection of ultrasound enhancing  agent, harmonic imaging was performed.  INDICATION: Unspecified combined systolic (congestive) and  diastolic (congestive) heart failure (I50.40)  ------------------------------------------------------------------------  DIMENSIONS:  Dimensions:     Normal Values:  LA:     5.0 cm    2.0 - 4.0 cm  Ao:     3.1 cm    2.0 - 3.8 cm  SEPTUM:0.8 cm    0.6 - 1.2 cm  PWT:    0.8 cm    0.6 - 1.1 cm  LVIDd:  7.3 cm    3.0 - 5.6 cm  LVIDs:    ---     1.8 - 4.0 cm  Derived Variables:  LVMI: 123 g/m2  RWT: 0.21  Ejection Fraction (Visual Estimate): 10 %  ------------------------------------------------------------------------  OBSERVATIONS:  Mitral Valve: Tethered mitral valve leaflets with normal  opening. Mild mitral regurgitation.  Aortic Root: Normal aortic root.  Aortic Valve: Calcified trileaflet aortic valve with normal  opening.  Left Atrium: Severely dilated left atrium.  LA volume index  = 50 cc/m2.  Left Ventricle: Severe global left ventricular systolic  dysfunction. Moderate left ventricular enlargement.  Right Heart: Normal right atrium. Right ventricular  enlargement withnormal right ventricular systolic  function. Normal tricuspid valve. Normal pulmonic valve.  Pericardium/PleuraNormal pericardium with no pericardial  effusion.  ------------------------------------------------------------------------  CONCLUSIONS:  1. Tethered mitral valve leaflets with normal opening. Mild  mitral regurgitation.  2. Calcified trileaflet aortic valve with normal opening.  3. Severely dilated left atrium.  LA volume index = 50  cc/m2.  4. Moderate left ventricular enlargement.  5. Severe global left ventricular systolic dysfunction.  6. Right ventricular enlargement with normal right  ventricular systolic function.

## 2022-11-01 NOTE — PROGRESS NOTE ADULT - PROBLEM SELECTOR PLAN 3
- switched to toprol 50mg daily   - transition to Eliquis, d/c heparin gtt as no plan for LHC or other procedure in patient   - HR better controlled on current regimen 80s-100s

## 2022-11-01 NOTE — PROGRESS NOTE ADULT - ASSESSMENT
62M with PMH EtOH abuse (quitted 2 month ago, 6 pack beer daily prior to that), NYHA Class 2 HFrEF 2/2 EtOH NICM ([facility] 2021 with 20% ramus and 30% pRCA lesion) and pAF admitted with ADHF     Acute Decompensated Heart Failure  TTE: EF 10% global LV systolic dysfunction  Clinically, his presentation is less likely to be ischemic in etiology. Would not trend troponin further   Would discontinue Brilinta given no history of stent and currently no ACS   Continue Lasix 40mg IV Q12H SOB seems to be out of proportion to exam EP consulted who recommend rate control, may discuss rhythm strategy in the future but has a significantly dilated left atrium  May need ICD evaluation after 3 months of GDMT  GDMT:  Losartan 25mg daily and Toprol XL 50mg daily  Strict I/O & Daily standing weight    Paroxysmal Atrial Fibrillation  BB as above  Continue AC with heparin gtt      Note not final until signed by attending       Yan Wells MD  Cardiology Fellow PGY-5  Phone: [phone]    For all New Consults  [email or web address]   Login: denise 62M with PMH EtOH abuse (quitted 2 month ago, 6 pack beer daily prior to that), NYHA Class 2 HFrEF 2/2 EtOH NICM ([facility] 2021 with 20% ramus and 30% pRCA lesion) and pAF admitted with ADHF     Acute Decompensated Heart Failure  TTE: EF 10% global LV systolic dysfunction  Clinically, his presentation is less likely to be ischemic in etiology. Would not trend troponin further   Would discontinue Brilinta given no history of stent and currently no ACS   Continue Lasix 40mg IV Q12H today and switch to PO tomorrow  EP consulted who recommend rate control, may discuss rhythm strategy in the future but has a significantly dilated left atrium  May need ICD evaluation after 3 months of GDMT  GDMT:  Losartan 25mg daily and Toprol XL 50mg daily  Strict I/O & Daily standing weight    Paroxysmal Atrial Fibrillation  BB as above  Should be switched to DOAc      Note not final until signed by attending       Yan Wells MD  Cardiology Fellow PGY-5  Phone: [phone]    For all New Consults  [email or web address]   Login: denise 2022 03:02

## 2022-11-02 ENCOUNTER — TRANSCRIPTION ENCOUNTER (OUTPATIENT)
Age: 62
End: 2022-11-02

## 2022-11-02 VITALS — DIASTOLIC BLOOD PRESSURE: 61 MMHG | SYSTOLIC BLOOD PRESSURE: 107 MMHG | HEART RATE: 80 BPM

## 2022-11-02 DIAGNOSIS — R07.9 CHEST PAIN, UNSPECIFIED: ICD-10-CM

## 2022-11-02 LAB
A1C WITH ESTIMATED AVERAGE GLUCOSE RESULT: 5.7 % — HIGH (ref 4–5.6)
ANION GAP SERPL CALC-SCNC: 11 MMOL/L — SIGNIFICANT CHANGE UP (ref 7–14)
BUN SERPL-MCNC: 19 MG/DL — SIGNIFICANT CHANGE UP (ref 7–23)
CALCIUM SERPL-MCNC: 9.6 MG/DL — SIGNIFICANT CHANGE UP (ref 8.4–10.5)
CHLORIDE SERPL-SCNC: 94 MMOL/L — LOW (ref 98–107)
CHOLEST SERPL-MCNC: 130 MG/DL — SIGNIFICANT CHANGE UP
CO2 SERPL-SCNC: 28 MMOL/L — SIGNIFICANT CHANGE UP (ref 22–31)
CREAT SERPL-MCNC: 1.21 MG/DL — SIGNIFICANT CHANGE UP (ref 0.5–1.3)
EGFR: 67 ML/MIN/1.73M2 — SIGNIFICANT CHANGE UP
ESTIMATED AVERAGE GLUCOSE: 117 — SIGNIFICANT CHANGE UP
GLUCOSE SERPL-MCNC: 96 MG/DL — SIGNIFICANT CHANGE UP (ref 70–99)
HCT VFR BLD CALC: 42.5 % — SIGNIFICANT CHANGE UP (ref 39–50)
HDLC SERPL-MCNC: 50 MG/DL — SIGNIFICANT CHANGE UP
HGB BLD-MCNC: 14.2 G/DL — SIGNIFICANT CHANGE UP (ref 13–17)
LIPID PNL WITH DIRECT LDL SERPL: 64 MG/DL — SIGNIFICANT CHANGE UP
MAGNESIUM SERPL-MCNC: 2 MG/DL — SIGNIFICANT CHANGE UP (ref 1.6–2.6)
MCHC RBC-ENTMCNC: 29.8 PG — SIGNIFICANT CHANGE UP (ref 27–34)
MCHC RBC-ENTMCNC: 33.4 GM/DL — SIGNIFICANT CHANGE UP (ref 32–36)
MCV RBC AUTO: 89.3 FL — SIGNIFICANT CHANGE UP (ref 80–100)
NON HDL CHOLESTEROL: 80 MG/DL — SIGNIFICANT CHANGE UP
NRBC # BLD: 0 /100 WBCS — SIGNIFICANT CHANGE UP (ref 0–0)
NRBC # FLD: 0 K/UL — SIGNIFICANT CHANGE UP (ref 0–0)
PHOSPHATE SERPL-MCNC: 3.9 MG/DL — SIGNIFICANT CHANGE UP (ref 2.5–4.5)
PLATELET # BLD AUTO: 243 K/UL — SIGNIFICANT CHANGE UP (ref 150–400)
POTASSIUM SERPL-MCNC: 4.1 MMOL/L — SIGNIFICANT CHANGE UP (ref 3.5–5.3)
POTASSIUM SERPL-SCNC: 4.1 MMOL/L — SIGNIFICANT CHANGE UP (ref 3.5–5.3)
RBC # BLD: 4.76 M/UL — SIGNIFICANT CHANGE UP (ref 4.2–5.8)
RBC # FLD: 14.4 % — SIGNIFICANT CHANGE UP (ref 10.3–14.5)
SODIUM SERPL-SCNC: 133 MMOL/L — LOW (ref 135–145)
TRIGL SERPL-MCNC: 80 MG/DL — SIGNIFICANT CHANGE UP
WBC # BLD: 9.46 K/UL — SIGNIFICANT CHANGE UP (ref 3.8–10.5)
WBC # FLD AUTO: 9.46 K/UL — SIGNIFICANT CHANGE UP (ref 3.8–10.5)

## 2022-11-02 PROCEDURE — 99239 HOSP IP/OBS DSCHRG MGMT >30: CPT

## 2022-11-02 PROCEDURE — 99231 SBSQ HOSP IP/OBS SF/LOW 25: CPT | Mod: GC

## 2022-11-02 PROCEDURE — 99232 SBSQ HOSP IP/OBS MODERATE 35: CPT

## 2022-11-02 RX ORDER — ATORVASTATIN CALCIUM 80 MG/1
1 TABLET, FILM COATED ORAL
Qty: 30 | Refills: 0
Start: 2022-11-02 | End: 2022-12-01

## 2022-11-02 RX ORDER — LOSARTAN POTASSIUM 100 MG/1
1 TABLET, FILM COATED ORAL
Qty: 30 | Refills: 0
Start: 2022-11-02 | End: 2022-12-01

## 2022-11-02 RX ORDER — FUROSEMIDE 40 MG
1 TABLET ORAL
Qty: 60 | Refills: 0
Start: 2022-11-02 | End: 2022-12-01

## 2022-11-02 RX ORDER — ASPIRIN/CALCIUM CARB/MAGNESIUM 324 MG
1 TABLET ORAL
Qty: 30 | Refills: 0
Start: 2022-11-02 | End: 2022-12-01

## 2022-11-02 RX ORDER — METOPROLOL TARTRATE 50 MG
1 TABLET ORAL
Qty: 30 | Refills: 0
Start: 2022-11-02 | End: 2022-12-01

## 2022-11-02 RX ORDER — APIXABAN 2.5 MG/1
1 TABLET, FILM COATED ORAL
Qty: 60 | Refills: 0
Start: 2022-11-02 | End: 2022-12-01

## 2022-11-02 RX ADMIN — LOSARTAN POTASSIUM 25 MILLIGRAM(S): 100 TABLET, FILM COATED ORAL at 09:59

## 2022-11-02 RX ADMIN — APIXABAN 5 MILLIGRAM(S): 2.5 TABLET, FILM COATED ORAL at 05:53

## 2022-11-02 RX ADMIN — Medication 40 MILLIGRAM(S): at 17:53

## 2022-11-02 RX ADMIN — Medication 50 MILLIGRAM(S): at 05:53

## 2022-11-02 RX ADMIN — APIXABAN 5 MILLIGRAM(S): 2.5 TABLET, FILM COATED ORAL at 17:53

## 2022-11-02 RX ADMIN — Medication 81 MILLIGRAM(S): at 09:58

## 2022-11-02 RX ADMIN — Medication 40 MILLIGRAM(S): at 05:53

## 2022-11-02 NOTE — DISCHARGE NOTE NURSING/CASE MANAGEMENT/SOCIAL WORK - NSDCPEFALRISK_GEN_ALL_CORE
For information on Fall & Injury Prevention, visit: [email or web address] OR  [email or web address] OR  https://www.cdc.gov/steadi/patient.html

## 2022-11-02 NOTE — PROVIDER CONTACT NOTE (OTHER) - DATE AND TIME:
01-Nov-2022 10:59
02-Nov-2022 05:19
29-Oct-2022 17:55
29-Oct-2022 18:26
30-Oct-2022 04:56
29-Oct-2022 09:16
29-Oct-2022 09:16
29-Oct-2022 09:39
02-Nov-2022 16:38
31-Oct-2022 22:21
01-Nov-2022 22:39
29-Oct-2022 19:10

## 2022-11-02 NOTE — PROGRESS NOTE ADULT - ASSESSMENT
62M with PMHx nonobstructive CAD, NICM (EF <30% 9/2021), ETOH abuse, Afib presenting with SOB and LE edema x5 days. Admitted for acute CHF exacerbation and likely type 2 demand ischemia/NSTEMI. Was transffered from [facility] for poaaoble cath. Seen by cards, no plans for cath and non ischemic pattern. Plan for GDMT with outpatient followup

## 2022-11-02 NOTE — PROGRESS NOTE ADULT - PROBLEM SELECTOR PLAN 1
-on presentation with LE edema, mild crackles, elevated BNP, +SOB, orthopnea, now improved   - hx of HFrEF <30% EF 9/2021. Pt is noncompliant with meds for the last year, patient states due to lazziness   - Afib with RVR likely causing CHF exacerbation, TTE with EF 10% with severely dialted LA, moderate LV enlargement.   - CHF likely tachycardia induced vs ETOH induced CM given dilated LA, but will need ischemic evaluation; however patient unable to lay flat at this time, per cards no plans for cath ath this time, had LHC last year  - will plan to maximize with GDMT (losartan. metoprolol)   - transitioned to PO lasix   - no plan for LHC or other intervention, on eliquis   - outpatient followup for ICD once on GDMT for 3 months   - Strict I/O, NEEDS daily weight. Monitor UOP

## 2022-11-02 NOTE — DISCHARGE NOTE NURSING/CASE MANAGEMENT/SOCIAL WORK - PATIENT PORTAL LINK FT
You can access the FollowMyHealth Patient Portal offered by [facility] by registering at the following website: [email or web address]. By joining [employer]’s FollowMyHealth portal, you will also be able to view your health information using other applications (apps) compatible with our system.

## 2022-11-02 NOTE — PROGRESS NOTE ADULT - PROVIDER SPECIALTY LIST ADULT
Cardiology
Cardiology
Electrophysiology
Electrophysiology
Cardiology
Internal Medicine
Cardiology
Cardiology
Hospitalist
Hospitalist
Internal Medicine
Internal Medicine
Hospitalist

## 2022-11-02 NOTE — PROGRESS NOTE ADULT - ATTENDING COMMENTS
Continue IV Lasix.  Start losartan 25 mg po qd.  Change Lopressor to Toprol 25 mg po qd.
Remains in AF.  Increase Toprol to 50 mg po qd.  Need accurate I/O.  Likely coronary angiogram tomorrow.
The patient was seen and examined with the Cardiology Consultation Teaching Service.     No overnight events    No chest pain  No palpitations or dizziness     Dyspnea is not improving    Comfortable-appearing man in no acute distress  Alert and oriented  Afebrile  Vital signs stable  JVP is not elevated  Clear lungs  Normal heart sounds  Irregularly irregular  Soft, non-tender, non-distended abdomen  Extremities are warm and perfused  Pedal edema is present    Normal blood counts  PTT 87 on unfractionated heparin  Hyponatremia 133  GFR 77    hs-troponin 132, 144, 129  , CK-MB 12.8, normal ratio  pro-BNP 4021    Echocardiography demonstrates mild MR, severely dilated LA, moderate LV enlargement, severe global LV dysfunction, RV enlargement     Impression and Recommendations:   62-year-old man with chronic heart failure with severely reduced LV function thought to be related to alcohol use and atrial fibrillation who presented with increasing shortness of breath and evidence of volume overload, consistent with acute decompensated heart failure in the setting of medication non-adherence.    The patient has some evidence of continued volume overload, although his dyspnea is out of proportion to his examination. Would continue intravenous diuretics for the time being. Will attempt to further rate control and consider consultation with EP for possible cardioversion given his continued symptoms and tachycardia.      Agustín Ga MD  Cardiology  [phone]
The patient was seen and examined with the Cardiology Consultation Teaching Service.     No overnight events    No chest pain  No dyspnea, orthopnea or PND  No palpitations or dizziness     Breathing is much improved  Orthopnea has resolved    Comfortable-appearing man in no acute distress  Alert and oriented  Afebrile  Vital signs stable  JVP is not elevated  Clear lungs  Normal heart sounds  Extremities are warm and perfused  No peripheral edema    Normal blood counts  Mild Hyponatremia  GFR 67    hs-troponin 132, 144, 129  , CK-MB 12.8, normal ratio  pro-BNP 4021    Echocardiography demonstrates mild MR, severely dilated LA, moderate LV enlargement, severe global LV dysfunction, RV enlargement     Impression and Recommendations:   62-year-old man with chronic heart failure with severely reduced LV function thought to be related to alcohol use and atrial fibrillation who presented with increasing shortness of breath and evidence of volume overload, consistent with acute decompensated heart failure in the setting of medication non-adherence.    The patient's symptoms have greatly improved over the last two days. He is now able to sleep in a reclining position and walk without much breathlessness. He is tolerating the transition to PO maintenance diuretics. Continue metoprolol succinate and losartan. These medications should be titrated to maximum tolerated doses as an outpatient. Continue aspirin and a high-potency statin, although the patient probably does not require atorvastatin 80, as I do not believe he experienced ACS.     If LVEF remains low after three months of appropriate medical therapy, primary prophylaxis ICD implantation should be considered.   Continue apixaban for reduction of stroke risk related to atrial fibrillation.    Agustín Ga MD  Cardiology  [phone]
The patient was seen and examined with the Cardiology Consultation Teaching Service.     No overnight events    No chest pain  No palpitations or dizziness     Feeling somewhat better today  Breathing is improving  Able to sleep a bit    Comfortable-appearing man in no acute distress  Alert and oriented  Afebrile  Vital signs stable  JVP is not elevated  Clear lungs  Normal heart sounds  Extremities are warm and perfused  Trace pedal edema     Normal blood counts  Mild hyponatremia  GFR 69    hs-troponin 132, 144, 129  , CK-MB 12.8, normal ratio  pro-BNP 4021    Echocardiography demonstrates mild MR, severely dilated LA, moderate LV enlargement, severe global LV dysfunction, RV enlargement     Impression and Recommendations:   62-year-old man with chronic heart failure with severely reduced LV function thought to be related to alcohol use and atrial fibrillation who presented with increasing shortness of breath and evidence of volume overload, consistent with acute decompensated heart failure in the setting of medication non-adherence.    The patient has some evidence of continued volume overload, although his dyspnea is out of proportion to his examination. Would continue intravenous diuretics, symptoms are now starting to improve. Will attempt to further rate control currently, with potential reconsideration of rhythm control in the future by EP.    Agustín Ga MD  Cardiology  [phone]

## 2022-11-02 NOTE — PROGRESS NOTE ADULT - REASON FOR ADMISSION
SOB, LE edema x5 days

## 2022-11-02 NOTE — PROGRESS NOTE ADULT - PROBLEM SELECTOR PLAN 2
- Troponin 129--144 in setting of CHF exacerbation and rapid Afib.   - per cards Last LHC at [facility] 9/2021 with 20% ramus lesion and 30% prox RCA lesion  - s/p brillinta and aspirin load at [facility], trop appears non ischemic per cards, will d/c brillinta   - Continue tele monitoring  - Continue aspirin statin, Metoprolol   - transition from heparin gtt to PO eliquis

## 2022-11-02 NOTE — PROGRESS NOTE ADULT - SUBJECTIVE AND OBJECTIVE BOX
[facility] Division of Hospital Medicine  Tony Hamiltonthais YANEZ  Pager [phone]  Available via MS Teams    SUBJECTIVE / OVERNIGHT EVENTS: patient able to lay on bed more comfortably. Feeling less SOB, Worked with PT and able to tolerate ambulation Discussed importance of being compliant with meds at home. Plan for d/c today patient eager to go home.     ADDITIONAL REVIEW OF SYSTEMS:    MEDICATIONS  (STANDING):  apixaban 5 milliGRAM(s) Oral every 12 hours  aspirin enteric coated 81 milliGRAM(s) Oral daily  atorvastatin 80 milliGRAM(s) Oral at bedtime  furosemide    Tablet 40 milliGRAM(s) Oral every 12 hours  influenza   Vaccine 0.5 milliLiter(s) IntraMuscular once  losartan 25 milliGRAM(s) Oral daily  metoprolol succinate ER 50 milliGRAM(s) Oral daily    MEDICATIONS  (PRN):  acetaminophen     Tablet .. 650 milliGRAM(s) Oral every 6 hours PRN Temp greater or equal to 38C (100.4F), Mild Pain (1 - 3)  melatonin 3 milliGRAM(s) Oral at bedtime PRN Insomnia      I&O's Summary    01 Nov 2022 07:01  -  02 Nov 2022 07:00  --------------------------------------------------------  IN: 0 mL / OUT: 1100 mL / NET: -1100 mL        PHYSICAL EXAM:  Vital Signs Last 24 Hrs  T(C): 36.6 (02 Nov 2022 09:55), Max: 36.8 (01 Nov 2022 18:13)  T(F): 97.9 (02 Nov 2022 09:55), Max: 98.3 (01 Nov 2022 18:13)  HR: 75 (02 Nov 2022 09:55) (71 - 86)  BP: 111/63 (02 Nov 2022 09:55) (89/58 - 111/66)  BP(mean): --  RR: 18 (02 Nov 2022 09:55) (16 - 20)  SpO2: 95% (02 Nov 2022 09:55) (95% - 100%)    Parameters below as of 02 Nov 2022 09:55  Patient On (Oxygen Delivery Method): room air      CONSTITUTIONAL: NAD, well-developed, well-groomed, sitting up in chair   RESPIRATORY: Normal respiratory effort; lungs are clear to auscultation bilaterally  CARDIOVASCULAR: Regular rate and rhythm, normal S1 and S2, no murmur/rub/gallop; trace lower extremity edema; Peripheral pulses are 2+ bilaterally  ABDOMEN: Nontender to palpation, normoactive bowel sounds, no rebound/guarding; No hepatosplenomegaly  MUSCULOSKELETAL:  Normal gait; no clubbing or cyanosis of digits; no joint swelling or tenderness to palpation  PSYCH: A+O to person, place, and time; affect appropriate  NEUROLOGY: no gross sensory deficits, non focal deficits   SKIN: No rashes; no palpable lesions    LABS:                        14.2   9.46  )-----------( 243      ( 02 Nov 2022 05:45 )             42.5     11-02    133<L>  |  94<L>  |  19  ----------------------------<  96  4.1   |  28  |  1.21    Ca    9.6      02 Nov 2022 05:45  Phos  3.9     11-02  Mg     2.00     11-02      PTT - ( 01 Nov 2022 12:55 )  PTT:89.4 sec

## 2022-11-02 NOTE — PROGRESS NOTE ADULT - PROBLEM SELECTOR PROBLEM 1
Acute exacerbation of CHF (congestive heart failure)

## 2022-11-02 NOTE — PROVIDER CONTACT NOTE (OTHER) - BACKGROUND
62 year old male admitted for NSTEMI
patient admitted for acute CHF exacerbation.
62 year old male admitted for NSTEMI
Admitted for CHF exacerbation. Hx of Afib.
Patient admitted for NSTEMI and acute CHF exacerbation. Hx of Afib, CHF, ETOH abuse.
patient AOx4, admitted for NSTEMI.
62 year old male admitted for NSTEMI
62 year old male admitted for NSTEMI

## 2022-11-02 NOTE — PROGRESS NOTE ADULT - NS ATTEND AMEND GEN_ALL_CORE FT
63 y/o M w/ longstanding ETOH abuse, nonobstructive CAD, HFrEF, long standing persistent atrial fibrillation, who  presented to [facility] with decompensated heart failure. Found to have elevated troponin  and transferred to [facility] ischemic evaluation for NSTEMI. Echo showed EF 10%. LA severely enlarged (5.0 cm). Took meds for one month last year but stopped. Now agreeable to medical therapy. Started on BB and overall rates acceptable. Will need ICD in 3 months after OMT. Discussed enrollment in JEWEL study, but he declined. Would continue with rate control for now. Will discuss rhythm control in the future, though given his LA size, unclear if we would maintain SR. Would attempt rhythm control as outpatient once he has been on HF therapy. Plan for outpatient follow up. EP to sign off.

## 2022-11-02 NOTE — PROVIDER CONTACT NOTE (OTHER) - SITUATION
Afib with frequent PVCs
Patient is unable to lay flat for EKG and states that he has difficulty breathing while laying down
Patient's BP is 99/70 and heart rate is 53.
Pt stayed awake all night . As per pt he is unable to lay down to sleep as he gets extremely SOB  and Is unable to breath.
pt had 7 beats of vtach
/88. Patient has Lasix 40mg, Losartan 25mg and Metoprolol Succinate 50mg due at 6:00.
BP 94/70
patient hypotensive
Patient had 10 beats of V. Tach
Patient was on 2L nasal cannula at change of shift. Should patient remain on supplemental oxygen?
Patient's BP is 93/65
Patient's manual BP is 98/62

## 2022-11-02 NOTE — PROGRESS NOTE ADULT - PROBLEM SELECTOR PROBLEM 3
Atrial fibrillation with rapid ventricular response

## 2022-11-02 NOTE — PROVIDER CONTACT NOTE (OTHER) - NAME OF MD/NP/PA/DO NOTIFIED:
Yanique Rodriguez
Valeri Vargas (#[id number])
[facility] [id number]
Shar Taylor
Valeri Vargas (#[id number])
Valeri Vargas (#[id number])
Valeri Vargas (#[id number])
Valeri Vargas (#[id number])
Yanique VOGEL
ACP Yanique Rodriguez
BRYCE Singh
Miriam Smith (#[id number])

## 2022-11-02 NOTE — PROGRESS NOTE ADULT - PROBLEM SELECTOR PLAN 3
- switched to toprol 50mg daily   - transition to Eliquis,  - HR better controlled on current regimen 80s-100s

## 2022-11-02 NOTE — PROGRESS NOTE ADULT - SUBJECTIVE AND OBJECTIVE BOX
Patient seen and examined at bedside.    Overnight Events:     REVIEW OF SYSTEMS:  General: no fatigue/malaise, weight loss/gain.  Skin: no rashes.  Ophthalmologic: no blurred vision, no loss of vision. 	  ENT: no sore throat, rhinorrhea, sinus congestion.  Respiratory: no SOB, cough or wheeze.  CV: No chest pain. No palpitations.   Gastrointestinal:  no N/V/D, no melena/hematemesis/hematochezia.  Genitourinary: no dysuria/hesitancy or hematuria.  Musculoskeletal: no myalgias or arthralgias.  Neurological: no changes in vision or hearing, no lightheadedness/dizziness, no syncope/near syncope	  Psychiatric: no unusual stress/anxiety.   Hematology/Lymphatics: no unusual bleeding, bruising and no lymphadenopathy.  Endocrine: no unusual thirst.        Current Meds:  acetaminophen     Tablet .. 650 milliGRAM(s) Oral every 6 hours PRN  apixaban 5 milliGRAM(s) Oral every 12 hours  aspirin enteric coated 81 milliGRAM(s) Oral daily  atorvastatin 80 milliGRAM(s) Oral at bedtime  furosemide    Tablet 40 milliGRAM(s) Oral every 12 hours  influenza   Vaccine 0.5 milliLiter(s) IntraMuscular once  losartan 25 milliGRAM(s) Oral daily  melatonin 3 milliGRAM(s) Oral at bedtime PRN  metoprolol succinate ER 50 milliGRAM(s) Oral daily      PAST MEDICAL & SURGICAL HISTORY:  Chronic systolic congestive heart failure      CAD (coronary artery disease)      ETOH abuse      Atrial fibrillation      No significant past surgical history          Vitals:  T(F): 97.5 (11-02), Max: 98.3 (11-01)  HR: 84 (11-02) (71 - 86)  BP: 108/88 (11-02) (89/58 - 111/66)  RR: 20 (11-02)  SpO2: 97% (11-02)  I&O's Summary    01 Nov 2022 07:01  -  02 Nov 2022 07:00  --------------------------------------------------------  IN: 0 mL / OUT: 1100 mL / NET: -1100 mL        Physical Exam:  Appearance: No acute distress  Eyes: PERRL, EOMI, pink conjunctiva  HENT: Normal oral mucosa  Cardiovascular: RRR, S1, S2, no murmurs, rubs, or gallops; no edema; no JVD  Respiratory: Clear to auscultation bilaterally  Gastrointestinal: soft, non-tender, non-distended with normal bowel sounds  Musculoskeletal: No clubbing; no joint deformity   Neurologic: Non-focal  Lymphatic: No lymphadenopathy  Psychiatry: AAOx3, mood & affect appropriate  Skin: No rashes, ecchymoses, or cyanosis                          14.2   9.46  )-----------( 243      ( 02 Nov 2022 05:45 )             42.5     11-02    133<L>  |  94<L>  |  19  ----------------------------<  96  4.1   |  28  |  1.21    Ca    9.6      02 Nov 2022 05:45  Phos  3.9     11-02  Mg     2.00     11-02      PTT - ( 01 Nov 2022 12:55 )  PTT:89.4 sec      Serum Pro-Brain Natriuretic Peptide: 4021 pg/mL (10-27 @ 19:37)          New ECG(s): Personally reviewed    Echo:    CONCLUSIONS:  1. Tethered mitral valve leaflets with normal opening. Mild  mitral regurgitation.  2. Calcified trileaflet aortic valve with normal opening.  3. Severely dilated left atrium.  LA volume index = 50  cc/m2.  4. Moderate left ventricular enlargement.  5. Severe global left ventricular systolic dysfunction.  6. Right ventricular enlargement with normal right  ventricular systolic function.       Patient seen and examined at bedside.    Overnight Events:   - Afib HR 90s to 100  - Denies CP, improvement in SOB and LE edema     Current Meds:  acetaminophen     Tablet .. 650 milliGRAM(s) Oral every 6 hours PRN  apixaban 5 milliGRAM(s) Oral every 12 hours  aspirin enteric coated 81 milliGRAM(s) Oral daily  atorvastatin 80 milliGRAM(s) Oral at bedtime  furosemide    Tablet 40 milliGRAM(s) Oral every 12 hours  influenza   Vaccine 0.5 milliLiter(s) IntraMuscular once  losartan 25 milliGRAM(s) Oral daily  melatonin 3 milliGRAM(s) Oral at bedtime PRN  metoprolol succinate ER 50 milliGRAM(s) Oral daily      PAST MEDICAL & SURGICAL HISTORY:  Chronic systolic congestive heart failure      CAD (coronary artery disease)      ETOH abuse      Atrial fibrillation      No significant past surgical history          Vitals:  T(F): 97.5 (11-02), Max: 98.3 (11-01)  HR: 84 (11-02) (71 - 86)  BP: 108/88 (11-02) (89/58 - 111/66)  RR: 20 (11-02)  SpO2: 97% (11-02)  I&O's Summary    01 Nov 2022 07:01  -  02 Nov 2022 07:00  --------------------------------------------------------  IN: 0 mL / OUT: 1100 mL / NET: -1100 mL        Physical Exam:  Appearance: No acute distress  Eyes: PERRL, EOMI, pink conjunctiva  HENT: Normal oral mucosa  Cardiovascular: RRR, S1, S2, no murmurs, rubs, or gallops; no edema; no JVD  Respiratory: Clear to auscultation bilaterally  Gastrointestinal: soft, non-tender, non-distended with normal bowel sounds  Musculoskeletal: No clubbing; no joint deformity   Neurologic: Non-focal  Lymphatic: No lymphadenopathy  Psychiatry: AAOx3, mood & affect appropriate  Skin: No rashes, ecchymoses, or cyanosis                          14.2   9.46  )-----------( 243      ( 02 Nov 2022 05:45 )             42.5     11-02    133<L>  |  94<L>  |  19  ----------------------------<  96  4.1   |  28  |  1.21    Ca    9.6      02 Nov 2022 05:45  Phos  3.9     11-02  Mg     2.00     11-02      PTT - ( 01 Nov 2022 12:55 )  PTT:89.4 sec      Serum Pro-Brain Natriuretic Peptide: 4021 pg/mL (10-27 @ 19:37)          New ECG(s): Personally reviewed    Echo:    CONCLUSIONS:  1. Tethered mitral valve leaflets with normal opening. Mild  mitral regurgitation.  2. Calcified trileaflet aortic valve with normal opening.  3. Severely dilated left atrium.  LA volume index = 50  cc/m2.  4. Moderate left ventricular enlargement.  5. Severe global left ventricular systolic dysfunction.  6. Right ventricular enlargement with normal right  ventricular systolic function.

## 2022-11-02 NOTE — PROGRESS NOTE ADULT - PROBLEM SELECTOR PROBLEM 2
NSTEMI (non-ST elevation myocardial infarction)

## 2022-11-02 NOTE — PROVIDER CONTACT NOTE (OTHER) - ASSESSMENT
Afib with frequent PVCs. Patient A&Ox4. Denies chest pain, palpitations or SOB. No s/s of distress. VS as documented in flowsheet.
Patient is A&Ox4, Patient denies pain, chest pain, shortness of breath, nausea, vomiting or dizziness.
Pt appears tired verbalized feeling tired from lack of sleep
BP 90/65, HR 77, patient denies dizziness
Patient A&Ox4. Denies chest pain, dizziness, lightheadedness. No s/s of distress noted.
Patient A&Ox4. Denies chest pain, dizziness, lightheadedness, or other complaints. No s/s of distress noted.
Patient is A&Ox4, Patient denies pain, chest pain, shortness of breath, nausea, vomiting or dizziness.
pt denies any complaints.
Patient is A&Ox4, Patient denies pain, chest pain, nausea, vomiting or dizziness.
Patient is A&Ox4, Patient denies pain, chest pain, shortness of breath, nausea, vomiting or dizziness.

## 2022-11-02 NOTE — PROGRESS NOTE ADULT - ASSESSMENT
61yo M w/ longstanding ETOH abuse, nonobstructive CAD, HFrEF, (noncompliant,  atrial fibrillation, diagnosed one year ago at [facility] who  presented to [facility] with decompensated heart failure. Found to have elevated troponin  and transferred to [facility] ischemic evaluation for NSTEMI.  Patient states had a cardiac angiogram one year ago at [facility] showing no blockages and an echo showing a weak heart. He was started on GDMT and anticoagulation and was presented with the idea of an ICD but did not follow-up with any doctors and did not continue the medications due to "being lazy".  During this admission he has been in atrial fibrillation w/RVR 's, Started on metoprolol succinate ER 50mg daily and being diuresed with IV Lasix with minimal subjective improvement.  Currently on heparin for anticoagulation. Spoke with patient in detail about the importance of continuing GDMT and anticoagulation upon discharge. Discussed the possibility of an ICD if after three months of continued medication his LVEF does not improve to > 35%. We offered a life vest/Jewel study for 3 months as a bridge but he refused.     TTE:  tethered mitral leaflets with mild MR, severely dilated left atrium and severe global LV systolic dysfunction with LVEF 10%. RV with normal systolic function.       Plan:  --Continue AC for thromboembolic ppx   -- Continue BB for rate control and rhythm control in the future    -- Continue OMT   -- Continue Lasix  -- Continue telemetry monitoring, overnight Afib rate controlled  -- Continue care as per primary team 63yo M w/ longstanding ETOH abuse, nonobstructive CAD, HFrEF, (noncompliant,  atrial fibrillation, diagnosed one year ago at [facility] who  presented to [facility] with decompensated heart failure. Found to have elevated troponin  and transferred to [facility] ischemic evaluation for NSTEMI.  Patient states had a cardiac angiogram one year ago at [facility] showing no blockages and an echo showing a weak heart. He was started on GDMT and anticoagulation and was presented with the idea of an ICD but did not follow-up with any doctors and did not continue the medications due to "being lazy".  During this admission he has been in atrial fibrillation w/RVR 's, Started on metoprolol succinate ER 50mg daily and being diuresed with IV Lasix with minimal subjective improvement.  Currently on heparin for anticoagulation. Spoke with patient in detail about the importance of continuing GDMT and anticoagulation upon discharge. Discussed the possibility of an ICD if after three months of continued medication his LVEF does not improve to > 35%. We offered a life vest/Jewel study for 3 months as a bridge but he refused.     TTE:  tethered mitral leaflets with mild MR, severely dilated left atrium and severe global LV systolic dysfunction with LVEF 10%. RV with normal systolic function.       Plan:  --Continue AC for thromboembolic ppx   -- Continue BB for rate control and rhythm control in the future    -- Continue OMT   -- Continue Lasix  -- Continue telemetry monitoring, overnight Afib rate controlled  -- Patient to follow up with Dr. Perez on 12/7/22 at 9:15am in the EP Clinic ( 4th floor Oncology building [facility] [address], [place], [place], [address] [phone] )  -- Continue care as per primary team

## 2022-11-02 NOTE — PROGRESS NOTE ADULT - PROBLEM SELECTOR PLAN 4
- Last drink 6 weeks ago. No withdrawal sx at this time; no tremors noted  - Monitor Symptoms, no signs of active withdrawal at this time

## 2022-11-02 NOTE — PROGRESS NOTE ADULT - ASSESSMENT
62M with PMH EtOH abuse (quitted 2 month ago, 6 pack beer daily prior to that), NYHA Class 2 HFrEF 2/2 EtOH NICM ([facility] 2021 with 20% ramus and 30% pRCA lesion) and pAF admitted with ADHF     Acute Decompensated Heart Failure  TTE: EF 10% global LV systolic dysfunction  GDMT:  Losartan 25mg daily and Toprol XL 50mg daily  Clinically, his presentation is less likely to be ischemic in etiology. Would not trend troponin further   Would discontinue Brilinta given no history of stent and currently no ACS   Switched to PO Lasix 40mg Q12H   EP consulted who recommend rate control, may discuss rhythm strategy in the future but has a significantly dilated left atrium  May need ICD evaluation after 3 months of GDMT  Would check lipid profile, A1C  Strict I/O & Daily standing weight    Paroxysmal Atrial Fibrillation (CHADSVASC =   BB as above  Eliquis 5mg Q12H for AC       Note not final until signed by attending       Yan Wells MD  Cardiology Fellow PGY-5  Phone: [phone]    For all New Consults  [email or web address]   Login: denise 62M with PMH EtOH abuse (quitted 2 month ago, 6 pack beer daily prior to that), NYHA Class 2 HFrEF 2/2 EtOH NICM ([facility] 2021 with 20% ramus and 30% pRCA lesion) and pAF admitted with ADHF     Acute Decompensated Heart Failure  TTE: EF 10% global LV systolic dysfunction  GDMT:  Losartan 25mg daily and Toprol XL 50mg daily  Clinically, his presentation is less likely to be ischemic in etiology. Would not trend troponin further   Switched to PO Lasix 40mg Q12H and counselled if feel Lightheaded/Dizzy would cut back  EP consulted who recommend rate control, may discuss rhythm strategy in the future but has a significantly dilated left atrium  May need ICD evaluation after 3 months of GDMT      Paroxysmal Atrial Fibrillation  BB as above  Eliquis 5mg Q12H for AC       Patient can follow up at [facility] Cardiology clinic within 1 week of discharge       Note not final until signed by attending       Yan Wells MD  Cardiology Fellow PGY-5  Phone: [phone]    For all New Consults  [email or web address]   Login: denise

## 2022-11-02 NOTE — PROVIDER CONTACT NOTE (OTHER) - REASON
pt had 7 beats of vtach
Afib with frequent PVCs
BP 94/70
Patient had 10 beats of V. Tach
Patient is unable to lay flat for EKG and states that he has difficulty breathing while laying down
hypotension
Patient's BP is 99/70 and heart rate is 53.
Insomia awake all night
/88
Patient was on 2L nasal cannula at change of shift. Should patient remain on supplemental oxygen?
Patient's BP is 93/65
Patient's manual BP is 98/62

## 2022-11-02 NOTE — PROVIDER CONTACT NOTE (OTHER) - ACTION/TREATMENT ORDERED:
As per provider Miriam Smith (#[id number]), ok to perform EKG while patient is standing. RN will continue to monitor.
As per provider Valeri Vargas (#[id number]) recheck BP in an hour and hold metoprolol dose at this time. RN will continue to monitor.
provider aware. continue to monitor
No new intervention at this time pt maintained on 2l/min supplemental oxygen.  With SP02 at %. Pt remains sitting up in the chair. Verblized feeling better.  hand off given to day Gen Andujar
Ok to give Lasix and Metoprolol. reschedule Losartan for 9:00.
ACP aware. No new orders at this time.
As per provider Valeri Vargas (#[id number]) RN may administer Lasix dose at this time. RN will continue to monitor.
As per provider Valeri Vargas (#[id number]) will review the patient's chart to determine if he needs supplemental oxygen. RN will continue to monitor.
As per provider Valeri Vargas (#[id number]) will consult cardiology in regards to administering Lasix at this time. RN will continue to monitor.
as per ACP, hold lasix, reassess BP in one hour.
As per provider Valeri Vargas (#[id number]) obtain a 12 lead EKG. RN will continue to monitor.
ACP aware. No new orders at this time. Will update for any changes.

## 2022-11-03 PROBLEM — F10.10 ALCOHOL ABUSE, UNCOMPLICATED: Chronic | Status: ACTIVE | Noted: 2022-10-28

## 2022-11-03 PROBLEM — I48.91 UNSPECIFIED ATRIAL FIBRILLATION: Chronic | Status: ACTIVE | Noted: 2022-10-28

## 2022-11-03 PROBLEM — I50.22 CHRONIC SYSTOLIC (CONGESTIVE) HEART FAILURE: Chronic | Status: ACTIVE | Noted: 2022-10-28

## 2022-11-03 PROBLEM — I25.10 ATHEROSCLEROTIC HEART DISEASE OF NATIVE CORONARY ARTERY WITHOUT ANGINA PECTORIS: Chronic | Status: ACTIVE | Noted: 2022-10-28

## 2022-11-23 ENCOUNTER — NON-APPOINTMENT (OUTPATIENT)
Age: 62
End: 2022-11-23

## 2022-11-23 ENCOUNTER — LABORATORY RESULT (OUTPATIENT)
Age: 62
End: 2022-11-23

## 2022-11-23 ENCOUNTER — APPOINTMENT (OUTPATIENT)
Dept: CARDIOLOGY | Facility: CLINIC | Age: 62
End: 2022-11-23

## 2022-11-23 ENCOUNTER — APPOINTMENT (OUTPATIENT)
Dept: ELECTROPHYSIOLOGY | Facility: CLINIC | Age: 62
End: 2022-11-23
Payer: MEDICAID

## 2022-11-23 VITALS
WEIGHT: 225 LBS | DIASTOLIC BLOOD PRESSURE: 73 MMHG | OXYGEN SATURATION: 97 % | TEMPERATURE: 98.2 F | HEIGHT: 69 IN | SYSTOLIC BLOOD PRESSURE: 119 MMHG | BODY MASS INDEX: 33.33 KG/M2 | HEART RATE: 86 BPM

## 2022-11-23 DIAGNOSIS — F10.10 ALCOHOL ABUSE, UNCOMPLICATED: ICD-10-CM

## 2022-11-23 DIAGNOSIS — Z00.00 ENCOUNTER FOR GENERAL ADULT MEDICAL EXAMINATION W/OUT ABNORMAL FINDINGS: ICD-10-CM

## 2022-11-23 DIAGNOSIS — I21.4 NON-ST ELEVATION (NSTEMI) MYOCARDIAL INFARCTION: ICD-10-CM

## 2022-11-23 DIAGNOSIS — I25.10 ATHEROSCLEROTIC HEART DISEASE OF NATIVE CORONARY ARTERY W/OUT ANGINA PECTORIS: ICD-10-CM

## 2022-11-23 DIAGNOSIS — I48.0 PAROXYSMAL ATRIAL FIBRILLATION: ICD-10-CM

## 2022-11-23 DIAGNOSIS — I50.22 CHRONIC SYSTOLIC (CONGESTIVE) HEART FAILURE: ICD-10-CM

## 2022-11-23 PROCEDURE — 93000 ELECTROCARDIOGRAM COMPLETE: CPT

## 2022-11-23 PROCEDURE — 93000 ELECTROCARDIOGRAM COMPLETE: CPT | Mod: 77

## 2022-11-23 PROCEDURE — 36415 COLL VENOUS BLD VENIPUNCTURE: CPT

## 2022-11-23 PROCEDURE — 99213 OFFICE O/P EST LOW 20 MIN: CPT | Mod: 25

## 2022-11-23 PROCEDURE — 99214 OFFICE O/P EST MOD 30 MIN: CPT | Mod: 25

## 2022-11-23 RX ORDER — METOPROLOL SUCCINATE 50 MG/1
50 TABLET, EXTENDED RELEASE ORAL
Qty: 270 | Refills: 3 | Status: ACTIVE | COMMUNITY
Start: 2022-11-02 | End: 1900-01-01

## 2022-11-23 RX ORDER — LOSARTAN POTASSIUM 25 MG/1
25 TABLET, FILM COATED ORAL
Qty: 90 | Refills: 2 | Status: ACTIVE | COMMUNITY
Start: 2022-11-02 | End: 1900-01-01

## 2022-11-23 RX ORDER — APIXABAN 5 MG/1
5 TABLET, FILM COATED ORAL
Qty: 180 | Refills: 0 | Status: ACTIVE | COMMUNITY
Start: 2022-11-02 | End: 1900-01-01

## 2022-11-23 RX ORDER — FUROSEMIDE 40 MG/1
40 TABLET ORAL
Qty: 120 | Refills: 3 | Status: ACTIVE | COMMUNITY
Start: 2022-11-02 | End: 1900-01-01

## 2022-11-23 RX ORDER — BLOOD-GLUCOSE METER
KIT MISCELLANEOUS
Qty: 1 | Refills: 0 | Status: ACTIVE | COMMUNITY
Start: 2022-11-23 | End: 1900-01-01

## 2022-11-23 RX ORDER — ATORVASTATIN CALCIUM 40 MG/1
40 TABLET, FILM COATED ORAL
Qty: 30 | Refills: 3 | Status: ACTIVE | COMMUNITY
Start: 2022-11-02

## 2022-11-23 NOTE — CARDIOLOGY SUMMARY
[de-identified] : 11/23/22 AF 89 with PVC's, NSST [de-identified] : 10/2022 TTE: TTE with EF 10% with severely \par dilated LA, moderate LV enlargement. \par 1. Tethered mitral valve leaflets with normal opening. Mild\par mitral regurgitation.\par 2. Calcified trileaflet aortic valve with normal opening.\par 3. Severely dilated left atrium.  LA volume index = 50\par cc/m2.\par 4. Moderate left ventricular enlargement.\par 5. Severe global left ventricular systolic dysfunction.\par 6. Right ventricular enlargement with normal right\par ventricular systolic function. [de-identified] : [facility] 2021 with 20% ramus and 30% pRCA lesion)

## 2022-11-23 NOTE — DISCUSSION/SUMMARY
[Patient] : the patient [___ Week(s)] : in [unfilled] week(s) [FreeTextEntry1] : 1. S/P Acute exacerbation of CHF (congestive heart failure). \par  -on presentation with LE edema, mild crackles, elevated BNP, +SOB, orthopnea, \par which improved with serum pro BNP 4021\par - hx of HFrEF <30% EF 9/2021. Pt is noncompliant with meds for the last year and had recent death of his wife 7/2022,  for 40 years\par -TTE with EF 10% with severely  dilated LA, moderate LV enlargement. \par - CHF likely tachycardia induced vs ETOH induced CM given dilated LA, pt had ischemic evaluation with [facility] [place] 2021\par - will plan to maximize with GDMT , will increase metoprolol to 75 mg daily from 50 mg\par - will continue losartan 25 mg daily, will check labs and if K and renal function are stable, will transition to Entresto\par - decrease furosemide to 40 mg daily alternating with 40 mg bid with additional as needed for weight gain of 2-3 lbs in 2-3 days with goal weight 212 lbs \par - no plan for LHC or other intervention since he had LHC 2021\par - Will repeat TTE in 1/2023 and if LVEF is < 35%, will refer to EP for ICD.\par \par  2:   NSTEMI (non-ST elevation myocardial infarction). \par - Troponin 129--144 in setting of CHF exacerbation and rapid Afib. \par - per cards Last LHC at [place] 9/2021 with 20% ramus lesion and 30% prox RCA \par lesion \par - s/p brillinta and aspirin load at [facility], trop appears non  ischemic per cards, brillinta was d/c during admission\par - D/C ASA and decrease atorvastatin to 40 mg from 80 mg, increase Metoprolol as above \par \par 3.  Atrial fibrillation with rapid ventricular response. \par -increase Toprol 75 mg from 50mg daily \par - continue Eliquis 5 mg bid \par - increase metoprolol as above\par \par 4. ETOH abuse. \par - Last drink 6 weeks PTA No withdrawal sx at this time; no tremors noted \par - Monitor Symptoms. \par \par \par Follow up in office in 3 weeks for further med up titration, will call with labs [EKG obtained to assist in diagnosis and management of assessed problem(s)] : EKG obtained to assist in diagnosis and management of assessed problem(s)

## 2022-11-23 NOTE — HISTORY OF PRESENT ILLNESS
[FreeTextEntry1] : Jun Call 62M with PMH ETOH abuse (quitted 2 month ago, 6 pack beer daily prior to that),\par NYHA Class 2 HFrEF 2/2 EtOH NICM ([facility] 2021 with 20% ramus and 30% pRCA lesion)\par and pAF admitted with ADHF with TTE with LVEF 10%. Pt is here for a post hospital follow up and initial HF evaluation after hospitalization 10/27/22-11/2/22. \par  \par Course in hospital; pt presented with SOB and LE edema x5 days. Admitted for acute CHF exacerbation \par and likely type 2 demand ischemia/NSTEMI. Was transferred from [facility] \[facility] for possible cath. Seen by cards, no plans for cath and non ischemic pattern.  During hospitalization Afib with RVR likely causing CHF exacerbation,  Plan for GDMT with outpatient followup \par \par Currently, pt states he is feeling well since d/c. He had previously ran out of his medication prior to this admission and admitted to drinking alcohol, stopped 3 months ago. PT lost his wife of 40 years in July 2022. He has 3 grown children and grandchildren.\par \par He works in [employer] at night and states his activity is not limited by shortness of breath. He can climb 3 flights of stairs without dyspnea. He sleeps with 2 pillows with no orthopnea/PND He is taking all his meds including; ASA, losartan 25 mg daily, eliquis 5 mg bid, metoprolol 50 mg daily, atorv 80 mg and furosemide 40 mg bid \par His weight today is 212 lbs with clothes from prior weight of 225 lbs. \par \par He admits to eating frozen food and some take out food since living alone. He denies chest pain, palpitations, dizziness/LH and syncope. He does not have an ICD. EKG with AF VR 86 bpm. \par \par

## 2022-11-23 NOTE — PHYSICAL EXAM
[Well Nourished] : well nourished [No Acute Distress] : no acute distress [Normal Conjunctiva] : normal conjunctiva [Normal S1, S2] : normal S1, S2 [No Murmur] : no murmur [Clear Lung Fields] : clear lung fields [Good Air Entry] : good air entry [Soft] : abdomen soft [Non Tender] : non-tender [Normal Gait] : normal gait [Normal] : moves all extremities, no focal deficits, normal speech [de-identified] : JVP 6 cm  [de-identified] : + left inguinal hernia [de-identified] : trace lower extremity edema bilaterally

## 2022-11-23 NOTE — ASSESSMENT
[FreeTextEntry1] : 62M with PMH ETOH abuse (quitted 2 month ago, 6 pack beer daily prior to that),\par NYHA Class 2 HFrEF 2/2 EtOH NICM ([facility] 2021 with 20% ramus and 30% pRCA lesion)\par and pAF admitted with ADHF with TTE with LVEF 10%. Pt is here for a post hospital follow up and initial HF evaluation after hospitalization 10/27/22-11/2/22. \par Appears euvolemic and normotensive

## 2022-11-26 PROBLEM — I48.0 AF (PAROXYSMAL ATRIAL FIBRILLATION): Status: ACTIVE | Noted: 2022-11-23

## 2022-11-26 PROBLEM — I50.22 CHRONIC SYSTOLIC HF (HEART FAILURE): Status: ACTIVE | Noted: 2022-11-23

## 2022-11-26 PROBLEM — I25.10 CAD (CORONARY ARTERY DISEASE): Status: ACTIVE | Noted: 2022-11-23

## 2022-11-26 NOTE — DISCUSSION/SUMMARY
[FreeTextEntry1] : In summary, this is a 62 year old man with Stage C/Class II nonischemic HFrEF (presumed to be ETOH), nonobstructive CAD, and long standing persistent AF. He has been doing well since discharge. He is seeing HF today for continued optimization of his medical therapy. If his EF does not improve, he will need a primary prevention ICD. He will continue with Eliquis at this time. \par  \par Mr. Call appeared to understand the whole discussion and verbalized that all of his questions were answered to his satisfaction.\par  \par Thank you for allowing me to be involved in the care of this pleasant man. Please feel free to contact me with any questions.\par  [EKG obtained to assist in diagnosis and management of assessed problem(s)] : EKG obtained to assist in diagnosis and management of assessed problem(s)

## 2022-11-26 NOTE — CARDIOLOGY SUMMARY
[de-identified] : 11/23/22: Atrial fibrillation at 89 bpm, frequent PVCs [de-identified] : 10/2022 TTE: TTE with EF 10% with severely \par dilated LA, moderate LV enlargement. \par 1. Tethered mitral valve leaflets with normal opening. Mild\par mitral regurgitation.\par 2. Calcified trileaflet aortic valve with normal opening.\par 3. Severely dilated left atrium. LA volume index = 50\par cc/m2.\par 4. Moderate left ventricular enlargement.\par 5. Severe global left ventricular systolic dysfunction.\par 6. Right ventricular enlargement with normal right\par ventricular systolic function.  [de-identified] : [facility] 2021 with 20% ramus and 30% pRCA lesion

## 2022-11-28 ENCOUNTER — NON-APPOINTMENT (OUTPATIENT)
Age: 62
End: 2022-11-28

## 2022-11-28 LAB
ALBUMIN SERPL ELPH-MCNC: 4.3 G/DL
ALP BLD-CCNC: 63 U/L
ALT SERPL-CCNC: 36 U/L
ANION GAP SERPL CALC-SCNC: 17 MMOL/L
AST SERPL-CCNC: 33 U/L
BASOPHILS # BLD AUTO: 0.03 K/UL
BASOPHILS NFR BLD AUTO: 0.4 %
BILIRUB SERPL-MCNC: 0.7 MG/DL
BUN SERPL-MCNC: 13 MG/DL
CALCIUM SERPL-MCNC: 10.1 MG/DL
CHLORIDE SERPL-SCNC: 96 MMOL/L
CHOLEST SERPL-MCNC: 124 MG/DL
CO2 SERPL-SCNC: 24 MMOL/L
CREAT SERPL-MCNC: 1.07 MG/DL
EGFR: 78 ML/MIN/1.73M2
EOSINOPHIL # BLD AUTO: 0.34 K/UL
EOSINOPHIL NFR BLD AUTO: 4.5 %
ESTIMATED AVERAGE GLUCOSE: 128 MG/DL
HBA1C MFR BLD HPLC: 6.1 %
HCT VFR BLD CALC: 38.4 %
HDLC SERPL-MCNC: 50 MG/DL
HGB BLD-MCNC: 12.8 G/DL
IMM GRANULOCYTES NFR BLD AUTO: 0.4 %
LDLC SERPL CALC-MCNC: 59 MG/DL
LYMPHOCYTES # BLD AUTO: 1.28 K/UL
LYMPHOCYTES NFR BLD AUTO: 17.1 %
MAGNESIUM SERPL-MCNC: 1.9 MG/DL
MAN DIFF?: NORMAL
MCHC RBC-ENTMCNC: 29.4 PG
MCHC RBC-ENTMCNC: 33.3 GM/DL
MCV RBC AUTO: 88.3 FL
MONOCYTES # BLD AUTO: 0.75 K/UL
MONOCYTES NFR BLD AUTO: 10 %
NEUTROPHILS # BLD AUTO: 5.06 K/UL
NEUTROPHILS NFR BLD AUTO: 67.6 %
NONHDLC SERPL-MCNC: 74 MG/DL
NT-PROBNP SERPL-MCNC: 2133 PG/ML
PLATELET # BLD AUTO: 200 K/UL
POTASSIUM SERPL-SCNC: 3.4 MMOL/L
PROT SERPL-MCNC: 7.3 G/DL
RBC # BLD: 4.35 M/UL
RBC # FLD: 14.3 %
SODIUM SERPL-SCNC: 138 MMOL/L
TRIGL SERPL-MCNC: 77 MG/DL
TSH SERPL-ACNC: 4.86 UIU/ML
WBC # FLD AUTO: 7.49 K/UL

## 2022-11-28 RX ORDER — SPIRONOLACTONE 25 MG/1
25 TABLET ORAL
Qty: 30 | Refills: 3 | Status: ACTIVE | COMMUNITY
Start: 2022-11-28 | End: 1900-01-01

## 2022-12-07 ENCOUNTER — APPOINTMENT (OUTPATIENT)
Dept: ELECTROPHYSIOLOGY | Facility: CLINIC | Age: 62
End: 2022-12-07

## 2022-12-19 ENCOUNTER — APPOINTMENT (OUTPATIENT)
Dept: HEART FAILURE | Facility: CLINIC | Age: 62
End: 2022-12-19

## 2024-10-17 NOTE — PATIENT PROFILE ADULT - NSPIPE USAGE_GEN_A_CORE_RD
[facility]  Yojana Rowley MD    Patient ID: [id number]     Chief Complaint: Procedure (Patient here for skin tags  removal.)      HPI:     Jonny Michaud is a 56 y.o. male here today for skin tag removal.  He has about 12 skin tags to bilateral flanks that he would like removed because they bother him when rubbing against clothing.    Past Medical History:   Diagnosis Date    Fatty liver     AUGIE (obstructive sleep apnea)     Personal history of tobacco use, presenting hazards to health 2023    Prediabetes     Smoker     Vitamin D deficiency         History reviewed. No pertinent surgical history.     Social History     Tobacco Use    Smoking status: Former     Current packs/day: 0.00     Average packs/day: 1 pack/day for 38.0 years (38.0 ttl pk-yrs)     Types: Cigarettes     Start date:      Quit date: 2023     Years since quittin.8    Smokeless tobacco: Never   Substance and Sexual Activity    Alcohol use: Yes     Alcohol/week: 21.0 standard drinks of alcohol     Types: 21 Cans of beer per week    Drug use: Never    Sexual activity: Not Currently        Current Outpatient Medications   Medication Instructions    benazepril-hydrochlorthiazide (LOTENSIN HCT) 20-12.5 mg per tablet 1 tablet, Oral, Every morning    diclofenac sodium (VOLTAREN ARTHRITIS PAIN) 2 g, Topical (Top), 4 times daily    ergocalciferol (ERGOCALCIFEROL) 50,000 Units, Oral, Every 5 days    NON FORMULARY MEDICATION Daily    rosuvastatin (CRESTOR) 5 mg, Oral, Nightly    vitamin E 400 Units, Daily       Review of patient's allergies indicates:  No Known Allergies     Patient Care Team:  Cayetano Ferguson Sr., MD as PCP - General (Family Medicine)  El Law MD as Consulting Physician (Gastroenterology)     Subjective:     Review of Systems    12 point review of systems conducted, negative except as stated in the history of present illness. See HPI for details.    Objective:     Visit Vitals  /86  "  Pulse 80   Temp 97.6 °F (36.4 °C)   Resp 20   Ht 6' (1.829 m)   Wt 117.9 kg (260 lb)   SpO2 97%   BMI 35.26 kg/m²       Physical Exam  Constitutional:       General: He is not in acute distress.     Appearance: Normal appearance.   HENT:      Head: Normocephalic and atraumatic.   Cardiovascular:      Rate and Rhythm: Normal rate.   Pulmonary:      Effort: Pulmonary effort is normal. No respiratory distress.   Skin:     Comments: 4 acrochordons to right flank, 8 acrochordons to left flank   Neurological:      General: No focal deficit present.      Mental Status: He is alert and oriented to person, place, and time.   Psychiatric:         Mood and Affect: Mood normal.         Behavior: Behavior normal.         Labs Reviewed:     Chemistry:  Lab Results   Component Value Date     08/29/2024    K 5.0 08/29/2024    BUN 11 08/29/2024    CREATININE 0.78 08/29/2024    EGFRNORACEVR 104 08/29/2024    CALCIUM 9.0 08/29/2024    ALBUMIN 4.1 08/29/2024    BILIDIR <0.20 02/20/2024    AST 32 08/29/2024    ALT 39 08/29/2024    [id number] 29.9 (L) 08/29/2024    TSH 2.060 08/29/2024        Lab Results   Component Value Date    HGBA1C 5.9 (H) 08/29/2024        Hematology:  Lab Results   Component Value Date    WBC 6.5 08/29/2024    HGB 15.6 08/29/2024    HCT 48.9 08/29/2024     08/29/2024       Lipid Panel:  Lab Results   Component Value Date    CHOL 154 08/29/2024    HDL 51 08/29/2024    TRIG 122 08/29/2024        Urine:  No results found for: "COLORUA", "APPEARANCEUA", "SGUA", "PHUA", "PROTEINUA", "GLUCOSEUA", "KETONESUA", "BLOODUA", "NITRITESUA", "LEUKOCYTESUR", "RBCUA", "WBCUA", "BACTERIA", "SQEPUA", "HYALINECASTS", "CREATRANDUR", "PROTEINURINE", "UPROTCREA"         Stin Tag removal    Date/Time: 10/17/2024 11:00 AM    Performed by: Yojana Rowley MD  Authorized by: Yojana Rowley MD    Consent Done?:  Yes (Verbal)  Timeout: prior to procedure the correct patient, procedure, and site was verified    Local anesthesia " used?: No    Assistants?: Yes    List of assistants:  Erik Lyles NP  : Suhastiagosherri (12)  Location: 4 to left flank, 8 to right flank.  Excision type:  Skin  Malignancy:  Benign  Specimens?: No     Hemostasis was obtained.   Patient tolerated the procedure well with no immediate complications.    The skin around each skin tag was prepped with iodine.  This was allowed to dry.  Scissors were used dyspneic each skin tag at its base.  Four were removed from the right flank and 8 were removed from the left flank.  There was minimal oozing of blood from each lesion.  Hemostasis was achieved with silver nitrate.  No dressings were needed.  The patient tolerated the procedure well.       Assessment:       ICD-10-CM ICD-9-CM   1. Acrochordon  L91.8 701.9        Plan:     1. Acrochordon  -     Stin Tag removal     12 total skin tags removed from trunk today.  See procedure note.    No follow-ups on file. In addition to their scheduled follow up, the patient has also been instructed to follow up on as needed basis.     Future Appointments   Date Time Provider Department Center   12/19/2024  9:40 AM LAB, [facility] [place]   12/30/2024  2:30 PM Cayetano Ferguson Sr., MD [facility] [place]   1/17/2025  8:00 AM [facility] CT1 [address] LIMIT [facility]   9/25/2025  8:00 AM LAB, [facility] [place]   10/2/2025  3:00 PM Cayetano Ferguson Sr., MD [facility] [place] Mary Curet, MD     Not Applicable
